# Patient Record
Sex: FEMALE | Race: WHITE | NOT HISPANIC OR LATINO | Employment: OTHER | URBAN - NONMETROPOLITAN AREA
[De-identification: names, ages, dates, MRNs, and addresses within clinical notes are randomized per-mention and may not be internally consistent; named-entity substitution may affect disease eponyms.]

---

## 2021-03-31 ENCOUNTER — HOSPITAL ENCOUNTER (EMERGENCY)
Facility: HOSPITAL | Age: 66
Discharge: HOME | End: 2021-03-31
Attending: EMERGENCY MEDICINE | Admitting: EMERGENCY MEDICINE
Payer: COMMERCIAL

## 2021-03-31 ENCOUNTER — APPOINTMENT (EMERGENCY)
Dept: RADIOLOGY | Facility: HOSPITAL | Age: 66
End: 2021-03-31
Payer: COMMERCIAL

## 2021-03-31 VITALS
SYSTOLIC BLOOD PRESSURE: 133 MMHG | HEART RATE: 96 BPM | DIASTOLIC BLOOD PRESSURE: 61 MMHG | OXYGEN SATURATION: 96 % | WEIGHT: 293 LBS | RESPIRATION RATE: 20 BRPM | TEMPERATURE: 96.5 F

## 2021-03-31 DIAGNOSIS — R07.9 CHEST PAIN: Primary | ICD-10-CM

## 2021-03-31 DIAGNOSIS — I10 HYPERTENSION: ICD-10-CM

## 2021-03-31 LAB
ALBUMIN SERPL BCP-MCNC: 3.4 G/DL (ref 3.5–5)
ALP SERPL-CCNC: 94 U/L (ref 46–116)
ALT SERPL W P-5'-P-CCNC: 45 U/L (ref 12–78)
ANION GAP SERPL CALCULATED.3IONS-SCNC: 11 MMOL/L (ref 4–13)
APTT PPP: 23 SECONDS (ref 23–37)
AST SERPL W P-5'-P-CCNC: 29 U/L (ref 5–45)
BASOPHILS # BLD AUTO: 0.03 THOUSANDS/ΜL (ref 0–0.1)
BASOPHILS NFR BLD AUTO: 0 % (ref 0–1)
BILIRUB SERPL-MCNC: 0.31 MG/DL (ref 0.2–1)
BUN SERPL-MCNC: 21 MG/DL (ref 5–25)
CALCIUM ALBUM COR SERPL-MCNC: 9.8 MG/DL (ref 8.3–10.1)
CALCIUM SERPL-MCNC: 9.3 MG/DL (ref 8.3–10.1)
CHLORIDE SERPL-SCNC: 99 MMOL/L (ref 100–108)
CO2 SERPL-SCNC: 28 MMOL/L (ref 21–32)
CREAT SERPL-MCNC: 1.28 MG/DL (ref 0.6–1.3)
EOSINOPHIL # BLD AUTO: 0.25 THOUSAND/ΜL (ref 0–0.61)
EOSINOPHIL NFR BLD AUTO: 2 % (ref 0–6)
ERYTHROCYTE [DISTWIDTH] IN BLOOD BY AUTOMATED COUNT: 13.2 % (ref 11.6–15.1)
GFR SERPL CREATININE-BSD FRML MDRD: 43 ML/MIN/1.73SQ M
GLUCOSE SERPL-MCNC: 270 MG/DL (ref 65–140)
HCT VFR BLD AUTO: 44.6 % (ref 34.8–46.1)
HGB BLD-MCNC: 14.6 G/DL (ref 11.5–15.4)
IMM GRANULOCYTES # BLD AUTO: 0.04 THOUSAND/UL (ref 0–0.2)
IMM GRANULOCYTES NFR BLD AUTO: 0 % (ref 0–2)
INR PPP: 0.88 (ref 0.84–1.19)
LYMPHOCYTES # BLD AUTO: 2.69 THOUSANDS/ΜL (ref 0.6–4.47)
LYMPHOCYTES NFR BLD AUTO: 24 % (ref 14–44)
MCH RBC QN AUTO: 27.3 PG (ref 26.8–34.3)
MCHC RBC AUTO-ENTMCNC: 32.7 G/DL (ref 31.4–37.4)
MCV RBC AUTO: 83 FL (ref 82–98)
MONOCYTES # BLD AUTO: 0.78 THOUSAND/ΜL (ref 0.17–1.22)
MONOCYTES NFR BLD AUTO: 7 % (ref 4–12)
NEUTROPHILS # BLD AUTO: 7.61 THOUSANDS/ΜL (ref 1.85–7.62)
NEUTS SEG NFR BLD AUTO: 67 % (ref 43–75)
NRBC BLD AUTO-RTO: 0 /100 WBCS
NT-PROBNP SERPL-MCNC: 51 PG/ML
PLATELET # BLD AUTO: 240 THOUSANDS/UL (ref 149–390)
PMV BLD AUTO: 9.7 FL (ref 8.9–12.7)
POTASSIUM SERPL-SCNC: 3.7 MMOL/L (ref 3.5–5.3)
PROT SERPL-MCNC: 7.4 G/DL (ref 6.4–8.2)
PROTHROMBIN TIME: 11.7 SECONDS (ref 11.6–14.5)
RBC # BLD AUTO: 5.35 MILLION/UL (ref 3.81–5.12)
SODIUM SERPL-SCNC: 138 MMOL/L (ref 136–145)
TROPONIN I SERPL-MCNC: <0.02 NG/ML
WBC # BLD AUTO: 11.4 THOUSAND/UL (ref 4.31–10.16)

## 2021-03-31 PROCEDURE — 93005 ELECTROCARDIOGRAM TRACING: CPT

## 2021-03-31 PROCEDURE — 85025 COMPLETE CBC W/AUTO DIFF WBC: CPT | Performed by: EMERGENCY MEDICINE

## 2021-03-31 PROCEDURE — 80053 COMPREHEN METABOLIC PANEL: CPT | Performed by: EMERGENCY MEDICINE

## 2021-03-31 PROCEDURE — 83880 ASSAY OF NATRIURETIC PEPTIDE: CPT | Performed by: EMERGENCY MEDICINE

## 2021-03-31 PROCEDURE — 99285 EMERGENCY DEPT VISIT HI MDM: CPT

## 2021-03-31 PROCEDURE — 36415 COLL VENOUS BLD VENIPUNCTURE: CPT | Performed by: EMERGENCY MEDICINE

## 2021-03-31 PROCEDURE — 84484 ASSAY OF TROPONIN QUANT: CPT | Performed by: EMERGENCY MEDICINE

## 2021-03-31 PROCEDURE — 85730 THROMBOPLASTIN TIME PARTIAL: CPT | Performed by: EMERGENCY MEDICINE

## 2021-03-31 PROCEDURE — 71045 X-RAY EXAM CHEST 1 VIEW: CPT

## 2021-03-31 PROCEDURE — 85610 PROTHROMBIN TIME: CPT | Performed by: EMERGENCY MEDICINE

## 2021-03-31 PROCEDURE — 99285 EMERGENCY DEPT VISIT HI MDM: CPT | Performed by: EMERGENCY MEDICINE

## 2021-03-31 RX ORDER — ASPIRIN 81 MG/1
162 TABLET, CHEWABLE ORAL ONCE
Status: COMPLETED | OUTPATIENT
Start: 2021-03-31 | End: 2021-03-31

## 2021-03-31 RX ORDER — NITROGLYCERIN 0.4 MG/1
0.4 TABLET SUBLINGUAL ONCE
Status: COMPLETED | OUTPATIENT
Start: 2021-03-31 | End: 2021-03-31

## 2021-03-31 RX ADMIN — ASPIRIN 162 MG: 81 TABLET, CHEWABLE ORAL at 19:10

## 2021-03-31 RX ADMIN — NITROGLYCERIN 0.4 MG: 0.4 TABLET SUBLINGUAL at 19:10

## 2021-03-31 NOTE — ED PROVIDER NOTES
History  Chief Complaint   Patient presents with    Chest Pain     Patient had periodic chest pain throughout the day and an episode of feeling her heart was racing  Patient is a 77-year-old female with history of coronary artery disease presents the emergency department with episode of chest pain that started earlier this afternoon described as 8/10 pain in the center of the chest associated with palpitations and shortness of breath described as heart racing  Patient reports that she took 2 aspirin and on EMS arrival symptoms have now resolved  Patient reports that she has had intermittent chest pains back pains and shortness of breath since being diagnosed with COVID and she has attributed the symptoms to being a COVID long hauler  Patient reports cardiac catheterization 2 years ago no recent cardiac stress test she is scheduled for an echocardiogram on Friday due to recent exertional dyspnea  History provided by:  Patient and EMS personnel  Chest Pain  Pain location:  Substernal area  Pain radiates to:  Does not radiate  Pain severity:  Moderate  Onset quality:  Sudden  Duration:  1 day  Timing:  Intermittent  Progression:  Resolved  Chronicity:  New  Associated symptoms: palpitations and shortness of breath    Associated symptoms: no abdominal pain, no cough, no dizziness, no fatigue, no fever, no headache, no nausea, no numbness, not vomiting and no weakness        None       Past Medical History:   Diagnosis Date    Disease of thyroid gland     MI (myocardial infarction) ([facility] [address] )        Past Surgical History:   Procedure Laterality Date    CARDIAC SURGERY      stent        History reviewed  No pertinent family history  I have reviewed and agree with the history as documented      E-Cigarette/Vaping    E-Cigarette Use Never User      E-Cigarette/Vaping Substances     Social History     Tobacco Use    Smoking status: Never Smoker    Smokeless tobacco: Never Used   Substance Use Topics    Alcohol use: Yes    Drug use: Not Currently       Review of Systems   Constitutional: Negative for activity change, appetite change, chills, fatigue and fever  HENT: Negative for congestion, ear pain, rhinorrhea and sore throat  Eyes: Negative for discharge, redness and visual disturbance  Respiratory: Positive for shortness of breath  Negative for cough, chest tightness and wheezing  Cardiovascular: Positive for chest pain and palpitations  Gastrointestinal: Negative for abdominal pain, constipation, diarrhea, nausea and vomiting  Endocrine: Negative for polydipsia and polyuria  Genitourinary: Negative for difficulty urinating, dysuria, frequency, hematuria and urgency  Musculoskeletal: Negative for arthralgias and myalgias  Skin: Negative for color change, pallor and rash  Neurological: Negative for dizziness, weakness, light-headedness, numbness and headaches  Hematological: Negative for adenopathy  Does not bruise/bleed easily  All other systems reviewed and are negative  Physical Exam  Physical Exam  Vitals signs and nursing note reviewed  Constitutional:       Appearance: She is well-developed  HENT:      Head: Normocephalic and atraumatic  Right Ear: External ear normal       Left Ear: External ear normal       Nose: Nose normal    Eyes:      Conjunctiva/sclera: Conjunctivae normal       Pupils: Pupils are equal, round, and reactive to light  Neck:      Musculoskeletal: Normal range of motion and neck supple  Cardiovascular:      Rate and Rhythm: Normal rate and regular rhythm  Heart sounds: Normal heart sounds  Pulmonary:      Effort: Pulmonary effort is normal  No respiratory distress  Breath sounds: Normal breath sounds  No wheezing or rales  Chest:      Chest wall: No tenderness  Abdominal:      General: Bowel sounds are normal  There is no distension  Palpations: Abdomen is soft  Tenderness: There is no abdominal tenderness   There is no guarding  Musculoskeletal: Normal range of motion  Skin:     General: Skin is warm and dry  Neurological:      Mental Status: She is alert and oriented to person, place, and time  Cranial Nerves: No cranial nerve deficit  Sensory: No sensory deficit           Vital Signs  ED Triage Vitals [03/31/21 1902]   Temperature Pulse Respirations Blood Pressure SpO2   (!) 96 5 °F (35 8 °C) 99 18 (!) 178/83 97 %      Temp Source Heart Rate Source Patient Position - Orthostatic VS BP Location FiO2 (%)   Temporal Monitor Lying Right arm --      Pain Score       No Pain           Vitals:    03/31/21 1902 03/31/21 1915 03/31/21 1945   BP: (!) 178/83 151/70 130/60   Pulse: 99 104 97   Patient Position - Orthostatic VS: Lying Lying Sitting         Visual Acuity      ED Medications  Medications   aspirin chewable tablet 162 mg (162 mg Oral Given 3/31/21 1910)   nitroglycerin (NITROSTAT) SL tablet 0 4 mg (0 4 mg Sublingual Given 3/31/21 1910)       Diagnostic Studies  Results Reviewed     Procedure Component Value Units Date/Time    NT-BNP PRO [[id number]]  (Normal) Collected: 03/31/21 1908    Lab Status: Final result Specimen: Blood from Arm, Left Updated: 03/31/21 1939     NT-proBNP 51 pg/mL     Comprehensive metabolic panel [[id number]]  (Abnormal) Collected: 03/31/21 1908    Lab Status: Final result Specimen: Blood from Arm, Left Updated: 03/31/21 1936     Sodium 138 mmol/L      Potassium 3 7 mmol/L      Chloride 99 mmol/L      CO2 28 mmol/L      ANION GAP 11 mmol/L      BUN 21 mg/dL      Creatinine 1 28 mg/dL      Glucose 270 mg/dL      Calcium 9 3 mg/dL      Corrected Calcium 9 8 mg/dL      AST 29 U/L      ALT 45 U/L      Alkaline Phosphatase 94 U/L      Total Protein 7 4 g/dL      Albumin 3 4 g/dL      Total Bilirubin 0 31 mg/dL      eGFR 43 ml/min/1 73sq m     Narrative:      [place] guidelines for Chronic Kidney Disease (CKD):     Stage 1 with normal or high GFR (GFR > 90 mL/min/1 73 square meters)    Stage 2 Mild CKD (GFR = 60-89 mL/min/1 73 square meters)    Stage 3A Moderate CKD (GFR = 45-59 mL/min/1 73 square meters)    Stage 3B Moderate CKD (GFR = 30-44 mL/min/1 73 square meters)    Stage 4 Severe CKD (GFR = 15-29 mL/min/1 73 square meters)    Stage 5 End Stage CKD (GFR <15 mL/min/1 73 square meters)  Note: GFR calculation is accurate only with a steady state creatinine    Troponin I [[id number]]  (Normal) Collected: 03/31/21 1908    Lab Status: Final result Specimen: Blood from Arm, Left Updated: 03/31/21 1935     Troponin I <0 02 ng/mL     Protime-INR [[id number]]  (Normal) Collected: 03/31/21 1908    Lab Status: Final result Specimen: Blood from Arm, Left Updated: 03/31/21 1929     Protime 11 7 seconds      INR 0 88    APTT [[id number]]  (Normal) Collected: 03/31/21 1908    Lab Status: Final result Specimen: Blood from Arm, Left Updated: 03/31/21 1929     PTT 23 seconds     CBC and differential [[id number]]  (Abnormal) Collected: 03/31/21 1908    Lab Status: Final result Specimen: Blood from Arm, Left Updated: 03/31/21 1915     WBC 11 40 Thousand/uL      RBC 5 35 Million/uL      Hemoglobin 14 6 g/dL      Hematocrit 44 6 %      MCV 83 fL      MCH 27 3 pg      MCHC 32 7 g/dL      RDW 13 2 %      MPV 9 7 fL      Platelets 915 Thousands/uL      nRBC 0 /100 WBCs      Neutrophils Relative 67 %      Immat GRANS % 0 %      Lymphocytes Relative 24 %      Monocytes Relative 7 %      Eosinophils Relative 2 %      Basophils Relative 0 %      Neutrophils Absolute 7 61 Thousands/µL      Immature Grans Absolute 0 04 Thousand/uL      Lymphocytes Absolute 2 69 Thousands/µL      Monocytes Absolute 0 78 Thousand/µL      Eosinophils Absolute 0 25 Thousand/µL      Basophils Absolute 0 03 Thousands/µL                  XR chest 1 view portable   ED Interpretation by Judeen Lesches, DO (03/31 1947)   No acute disease                 Procedures  ECG 12 Lead Documentation Only    Date/Time: 3/31/2021 7:08 PM  Performed by: Akira Diaz DO  Authorized by: Akira Diaz DO     ECG reviewed by me, the ED Provider: yes    Patient location:  ED  Previous ECG:     Comparison to cardiac monitor: Yes    Quality:     Tracing quality:  Limited by artifact  Rate:     ECG rate:  99    ECG rate assessment: normal    Rhythm:     Rhythm: sinus rhythm    QRS:     QRS axis:  Normal    QRS intervals:  Normal  ST segments:     ST segments:  Non-specific  T waves:     T waves: non-specific               ED Course  ED Course as of Mar 31 1959   Wed Mar 31, 2021   1954 Patient remained clinically and hemodynamically stable in the emergency department initial cardiac workup returns unremarkable patient family updated given her heart score of 6 and no recent cardiac stress test and severity of her chest pain symptoms this evening I recommended admission for further evaluation monitoring and treatment for her chest pain and ACS rule out patient and family understood the reasoning behind this recommendation and understand the risks including heart attack and death and loss of current function  Both patient and  are adamantly adverse to being admitted at this point they do wish to return home now she is asymptomatic and feeling well and states she will follow up promptly with her cardiologist Dr Dean Weston tomorrow and is scheduled for an echocardiogram on Friday  Advised to follow up promptly with Cardiology advised patient family that they will be signing out against medical advice they understand and agree they are welcome to return and understand this as well                    HEART Risk Score      Most Recent Value   Heart Score Risk Calculator   History  1 Filed at: 03/31/2021 1906   ECG  1 Filed at: 03/31/2021 1906   Age  2 Filed at: 03/31/2021 1906   Risk Factors  2 Filed at: 03/31/2021 1906   Troponin  0 Filed at: 03/31/2021 1906   HEART Score  6 Filed at: 03/31/2021 1906                      SBIRT 22yo+      Most Recent Value   SBIRT (22 yo +)   In order to provide better care to our patients, we are screening all of our patients for alcohol and drug use  Would it be okay to ask you these screening questions? Yes Filed at: 03/31/2021 1914   Initial Alcohol Screen: US AUDIT-C    1  How often do you have a drink containing alcohol? 1 Filed at: 03/31/2021 1914   2  How many drinks containing alcohol do you have on a typical day you are drinking? 1 Filed at: 03/31/2021 1914   3a  Male UNDER 65: How often do you have five or more drinks on one occasion? 0 Filed at: 03/31/2021 1914   3b  FEMALE Any Age, or MALE 65+: How often do you have 4 or more drinks on one occassion? 0 Filed at: 03/31/2021 1914   Audit-C Score  2 Filed at: 03/31/2021 1914   SELINA: How many times in the past year have you    Used an illegal drug or used a prescription medication for non-medical reasons? Never Filed at: 03/31/2021 1914                    MDM  Number of Diagnoses or Management Options  Chest pain: new and requires workup  Hypertension: new and requires workup  Diagnosis management comments: Patient remained clinically and hemodynamically stable in the emergency department she wrist return home on all my recommendation was for admission and further observation and monitoring she understood this and the reasons including no recent cardiac stress test and severity of chest pain tonight although it is resolved both her and spouse wished to return home advised prompt follow-up with Cardiology as soon as possible for further evaluation and testing patient family understand and agree and will follow up as advised patient signed out against medical advice understand she is welcome to return return precautions and anticipatory guidance discussed           Amount and/or Complexity of Data Reviewed  Clinical lab tests: ordered and reviewed  Tests in the radiology section of CPT®: ordered and reviewed  Tests in the medicine section of CPT®: ordered and reviewed  Decide to obtain previous medical records or to obtain history from someone other than the patient: yes  Review and summarize past medical records: yes  Independent visualization of images, tracings, or specimens: yes    Risk of Complications, Morbidity, and/or Mortality  Presenting problems: moderate  Diagnostic procedures: moderate  Management options: moderate    Patient Progress  Patient progress: stable      Disposition  Final diagnoses:   Chest pain   Hypertension     Time reflects when diagnosis was documented in both MDM as applicable and the Disposition within this note     Time User Action Codes Description Comment    3/31/2021  7:52 PM Ova Leon Add [R07 9] Chest pain     3/31/2021  7:52 PM Ova Leon Add [I10] Hypertension       ED Disposition     ED Disposition Condition Date/Time Comment    AMA  Wed Mar 31, 2021  7:53 PM Date: 3/31/2021  Patient: Matthew Harp  Admitted: 3/31/2021  6:58 PM  Attending Provider: Gamal Hyde DO    Matthew Harp or her authorized caregiver has made the decision for the patient to leave the emergency department against the advice  of her attending physician  She or her authorized caregiver has been informed and understands the inherent risks, including death, loss of current function  She or her authorized caregiver has decided to accept the responsibility for this decision  Matthew Harp and all necessary parties have been advised that she may return for further evaluation or treatment  Her condition at time of discharge was stable    Matthew Harp had current vital signs as follows:  /60 (BP Location: Left arm )   Pulse 97   Temp (!) 96 5 °F (35 8 °C) (Temporal)   Resp (!) 28   Wt (!) 146 kg (322 lb 15 6 oz)         Follow-up Information     Follow up With Specialties Details Why Contact Info      Schedule an appointment as soon as possible for a visit in 2 days  Your cardiologist Dr Lulu Allen          Patient's Medications    No medications on file     No discharge procedures on file      PDMP Review     None          ED Provider  Electronically Signed by           Robbi Lundberg DO  03/31/21 1959

## 2021-04-04 LAB
ATRIAL RATE: 99 BPM
P AXIS: 47 DEGREES
PR INTERVAL: 160 MS
QRS AXIS: 53 DEGREES
QRSD INTERVAL: 84 MS
QT INTERVAL: 358 MS
QTC INTERVAL: 459 MS
T WAVE AXIS: 36 DEGREES
VENTRICULAR RATE: 99 BPM

## 2021-04-04 PROCEDURE — 93010 ELECTROCARDIOGRAM REPORT: CPT | Performed by: INTERNAL MEDICINE

## 2021-04-06 DIAGNOSIS — Z23 ENCOUNTER FOR IMMUNIZATION: ICD-10-CM

## 2021-06-05 ENCOUNTER — APPOINTMENT (OUTPATIENT)
Dept: CT IMAGING | Facility: HOSPITAL | Age: 66
End: 2021-06-05
Payer: COMMERCIAL

## 2021-06-05 ENCOUNTER — APPOINTMENT (EMERGENCY)
Dept: RADIOLOGY | Facility: HOSPITAL | Age: 66
End: 2021-06-05
Payer: COMMERCIAL

## 2021-06-05 ENCOUNTER — HOSPITAL ENCOUNTER (OUTPATIENT)
Facility: HOSPITAL | Age: 66
Setting detail: OBSERVATION
Discharge: HOME | End: 2021-06-05
Attending: EMERGENCY MEDICINE | Admitting: FAMILY MEDICINE
Payer: COMMERCIAL

## 2021-06-05 VITALS
TEMPERATURE: 98.5 F | HEART RATE: 73 BPM | SYSTOLIC BLOOD PRESSURE: 139 MMHG | WEIGHT: 293 LBS | HEIGHT: 68 IN | OXYGEN SATURATION: 96 % | DIASTOLIC BLOOD PRESSURE: 63 MMHG | RESPIRATION RATE: 18 BRPM | BODY MASS INDEX: 44.41 KG/M2

## 2021-06-05 DIAGNOSIS — N17.9 AKI (ACUTE KIDNEY INJURY) (HCC): ICD-10-CM

## 2021-06-05 DIAGNOSIS — I10 HTN (HYPERTENSION): ICD-10-CM

## 2021-06-05 DIAGNOSIS — R93.2 ABNORMAL CT SCAN, GALLBLADDER: ICD-10-CM

## 2021-06-05 DIAGNOSIS — R07.9 CHEST PAIN: Primary | ICD-10-CM

## 2021-06-05 DIAGNOSIS — K80.50 BILIARY COLIC: ICD-10-CM

## 2021-06-05 PROBLEM — E03.9 ACQUIRED HYPOTHYROIDISM: Status: ACTIVE | Noted: 2021-06-05

## 2021-06-05 PROBLEM — E66.09 OBESITY DUE TO EXCESS CALORIES WITH SERIOUS COMORBIDITY: Status: ACTIVE | Noted: 2021-06-05

## 2021-06-05 PROBLEM — R60.0 LEG EDEMA: Status: ACTIVE | Noted: 2021-06-05

## 2021-06-05 PROBLEM — E11.65 TYPE 2 DIABETES MELLITUS WITH HYPERGLYCEMIA, WITHOUT LONG-TERM CURRENT USE OF INSULIN (HCC): Status: ACTIVE | Noted: 2021-06-05

## 2021-06-05 LAB
ALBUMIN SERPL BCP-MCNC: 3.3 G/DL (ref 3.5–5)
ALP SERPL-CCNC: 101 U/L (ref 46–116)
ALT SERPL W P-5'-P-CCNC: 32 U/L (ref 12–78)
ANION GAP SERPL CALCULATED.3IONS-SCNC: 3 MMOL/L (ref 4–13)
ANION GAP SERPL CALCULATED.3IONS-SCNC: 5 MMOL/L (ref 4–13)
APTT PPP: 26 SECONDS (ref 23–37)
AST SERPL W P-5'-P-CCNC: 22 U/L (ref 5–45)
BASOPHILS # BLD AUTO: 0.05 THOUSANDS/ΜL (ref 0–0.1)
BASOPHILS NFR BLD AUTO: 0 % (ref 0–1)
BILIRUB SERPL-MCNC: 0.27 MG/DL (ref 0.2–1)
BUN SERPL-MCNC: 18 MG/DL (ref 5–25)
BUN SERPL-MCNC: 18 MG/DL (ref 5–25)
CALCIUM ALBUM COR SERPL-MCNC: 9.6 MG/DL (ref 8.3–10.1)
CALCIUM SERPL-MCNC: 8.9 MG/DL (ref 8.3–10.1)
CALCIUM SERPL-MCNC: 9 MG/DL (ref 8.3–10.1)
CHLORIDE SERPL-SCNC: 100 MMOL/L (ref 100–108)
CHLORIDE SERPL-SCNC: 102 MMOL/L (ref 100–108)
CHOLEST SERPL-MCNC: 128 MG/DL (ref 50–200)
CO2 SERPL-SCNC: 30 MMOL/L (ref 21–32)
CO2 SERPL-SCNC: 32 MMOL/L (ref 21–32)
CREAT SERPL-MCNC: 1.27 MG/DL (ref 0.6–1.3)
CREAT SERPL-MCNC: 1.48 MG/DL (ref 0.6–1.3)
EOSINOPHIL # BLD AUTO: 0.25 THOUSAND/ΜL (ref 0–0.61)
EOSINOPHIL NFR BLD AUTO: 2 % (ref 0–6)
ERYTHROCYTE [DISTWIDTH] IN BLOOD BY AUTOMATED COUNT: 13.3 % (ref 11.6–15.1)
EST. AVERAGE GLUCOSE BLD GHB EST-MCNC: 220 MG/DL
GFR SERPL CREATININE-BSD FRML MDRD: 36 ML/MIN/1.73SQ M
GFR SERPL CREATININE-BSD FRML MDRD: 43 ML/MIN/1.73SQ M
GLUCOSE SERPL-MCNC: 180 MG/DL (ref 65–140)
GLUCOSE SERPL-MCNC: 201 MG/DL (ref 65–140)
GLUCOSE SERPL-MCNC: 217 MG/DL (ref 65–140)
GLUCOSE SERPL-MCNC: 230 MG/DL (ref 65–140)
GLUCOSE SERPL-MCNC: 236 MG/DL (ref 65–140)
GLUCOSE SERPL-MCNC: 249 MG/DL (ref 65–140)
HBA1C MFR BLD: 9.3 %
HCT VFR BLD AUTO: 44.3 % (ref 34.8–46.1)
HDLC SERPL-MCNC: 50 MG/DL
HGB BLD-MCNC: 14.3 G/DL (ref 11.5–15.4)
IMM GRANULOCYTES # BLD AUTO: 0.04 THOUSAND/UL (ref 0–0.2)
IMM GRANULOCYTES NFR BLD AUTO: 0 % (ref 0–2)
INR PPP: 0.91 (ref 0.84–1.19)
LDLC SERPL CALC-MCNC: 60 MG/DL (ref 0–100)
LIPASE SERPL-CCNC: 83 U/L (ref 73–393)
LYMPHOCYTES # BLD AUTO: 2.91 THOUSANDS/ΜL (ref 0.6–4.47)
LYMPHOCYTES NFR BLD AUTO: 26 % (ref 14–44)
MCH RBC QN AUTO: 27.2 PG (ref 26.8–34.3)
MCHC RBC AUTO-ENTMCNC: 32.3 G/DL (ref 31.4–37.4)
MCV RBC AUTO: 84 FL (ref 82–98)
MONOCYTES # BLD AUTO: 0.71 THOUSAND/ΜL (ref 0.17–1.22)
MONOCYTES NFR BLD AUTO: 6 % (ref 4–12)
NEUTROPHILS # BLD AUTO: 7.33 THOUSANDS/ΜL (ref 1.85–7.62)
NEUTS SEG NFR BLD AUTO: 66 % (ref 43–75)
NRBC BLD AUTO-RTO: 0 /100 WBCS
NT-PROBNP SERPL-MCNC: 97 PG/ML
PLATELET # BLD AUTO: 276 THOUSANDS/UL (ref 149–390)
PMV BLD AUTO: 10 FL (ref 8.9–12.7)
POTASSIUM SERPL-SCNC: 3.6 MMOL/L (ref 3.5–5.3)
POTASSIUM SERPL-SCNC: 3.6 MMOL/L (ref 3.5–5.3)
PROT SERPL-MCNC: 7 G/DL (ref 6.4–8.2)
PROTHROMBIN TIME: 12.1 SECONDS (ref 11.6–14.5)
RBC # BLD AUTO: 5.25 MILLION/UL (ref 3.81–5.12)
SODIUM SERPL-SCNC: 135 MMOL/L (ref 136–145)
SODIUM SERPL-SCNC: 137 MMOL/L (ref 136–145)
TRIGL SERPL-MCNC: 92 MG/DL
TROPONIN I SERPL-MCNC: <0.02 NG/ML
TSH SERPL DL<=0.05 MIU/L-ACNC: 1.81 UIU/ML (ref 0.36–3.74)
WBC # BLD AUTO: 11.29 THOUSAND/UL (ref 4.31–10.16)

## 2021-06-05 PROCEDURE — 71045 X-RAY EXAM CHEST 1 VIEW: CPT

## 2021-06-05 PROCEDURE — NC001 PR NO CHARGE: Performed by: FAMILY MEDICINE

## 2021-06-05 PROCEDURE — 36415 COLL VENOUS BLD VENIPUNCTURE: CPT | Performed by: EMERGENCY MEDICINE

## 2021-06-05 PROCEDURE — 85730 THROMBOPLASTIN TIME PARTIAL: CPT | Performed by: EMERGENCY MEDICINE

## 2021-06-05 PROCEDURE — 84484 ASSAY OF TROPONIN QUANT: CPT | Performed by: EMERGENCY MEDICINE

## 2021-06-05 PROCEDURE — 84443 ASSAY THYROID STIM HORMONE: CPT | Performed by: NURSE PRACTITIONER

## 2021-06-05 PROCEDURE — 83036 HEMOGLOBIN GLYCOSYLATED A1C: CPT | Performed by: NURSE PRACTITIONER

## 2021-06-05 PROCEDURE — 80053 COMPREHEN METABOLIC PANEL: CPT | Performed by: EMERGENCY MEDICINE

## 2021-06-05 PROCEDURE — 80061 LIPID PANEL: CPT | Performed by: NURSE PRACTITIONER

## 2021-06-05 PROCEDURE — 82948 REAGENT STRIP/BLOOD GLUCOSE: CPT

## 2021-06-05 PROCEDURE — 83880 ASSAY OF NATRIURETIC PEPTIDE: CPT | Performed by: EMERGENCY MEDICINE

## 2021-06-05 PROCEDURE — 99285 EMERGENCY DEPT VISIT HI MDM: CPT | Performed by: EMERGENCY MEDICINE

## 2021-06-05 PROCEDURE — 93005 ELECTROCARDIOGRAM TRACING: CPT

## 2021-06-05 PROCEDURE — 80048 BASIC METABOLIC PNL TOTAL CA: CPT | Performed by: NURSE PRACTITIONER

## 2021-06-05 PROCEDURE — 99285 EMERGENCY DEPT VISIT HI MDM: CPT

## 2021-06-05 PROCEDURE — 85025 COMPLETE CBC W/AUTO DIFF WBC: CPT | Performed by: EMERGENCY MEDICINE

## 2021-06-05 PROCEDURE — G1004 CDSM NDSC: HCPCS

## 2021-06-05 PROCEDURE — 74176 CT ABD & PELVIS W/O CONTRAST: CPT

## 2021-06-05 PROCEDURE — 85610 PROTHROMBIN TIME: CPT | Performed by: EMERGENCY MEDICINE

## 2021-06-05 PROCEDURE — 99236 HOSP IP/OBS SAME DATE HI 85: CPT | Performed by: FAMILY MEDICINE

## 2021-06-05 PROCEDURE — 83690 ASSAY OF LIPASE: CPT | Performed by: FAMILY MEDICINE

## 2021-06-05 RX ORDER — HYDROCHLOROTHIAZIDE 25 MG/1
25 TABLET ORAL
COMMUNITY
End: 2021-06-05 | Stop reason: HOSPADM

## 2021-06-05 RX ORDER — NEBIVOLOL 5 MG/1
5 TABLET ORAL
COMMUNITY
End: 2021-06-05 | Stop reason: HOSPADM

## 2021-06-05 RX ORDER — LOSARTAN POTASSIUM 50 MG/1
50 TABLET ORAL DAILY
Status: DISCONTINUED | OUTPATIENT
Start: 2021-06-05 | End: 2021-06-05 | Stop reason: HOSPADM

## 2021-06-05 RX ORDER — ACETAMINOPHEN 325 MG/1
650 TABLET ORAL EVERY 6 HOURS PRN
Status: DISCONTINUED | OUTPATIENT
Start: 2021-06-05 | End: 2021-06-05 | Stop reason: HOSPADM

## 2021-06-05 RX ORDER — LEVOTHYROXINE SODIUM 0.15 MG/1
175 TABLET ORAL
COMMUNITY

## 2021-06-05 RX ORDER — LEVOTHYROXINE SODIUM 175 UG/1
175 TABLET ORAL
Status: DISCONTINUED | OUTPATIENT
Start: 2021-06-05 | End: 2021-06-05 | Stop reason: HOSPADM

## 2021-06-05 RX ORDER — PANTOPRAZOLE SODIUM 20 MG/1
20 TABLET, DELAYED RELEASE ORAL DAILY
COMMUNITY
Start: 2021-03-14

## 2021-06-05 RX ORDER — NITROGLYCERIN 0.4 MG/1
TABLET SUBLINGUAL
COMMUNITY

## 2021-06-05 RX ORDER — ASPIRIN 81 MG/1
81 TABLET, CHEWABLE ORAL DAILY
Status: DISCONTINUED | OUTPATIENT
Start: 2021-06-06 | End: 2021-06-05 | Stop reason: HOSPADM

## 2021-06-05 RX ORDER — LOSARTAN POTASSIUM 50 MG/1
50 TABLET ORAL
COMMUNITY
Start: 2021-01-28

## 2021-06-05 RX ORDER — MELOXICAM 15 MG/1
15 TABLET ORAL
COMMUNITY
End: 2021-06-05 | Stop reason: HOSPADM

## 2021-06-05 RX ORDER — EZETIMIBE 10 MG/1
10 TABLET ORAL
Status: DISCONTINUED | OUTPATIENT
Start: 2021-06-05 | End: 2021-06-05 | Stop reason: HOSPADM

## 2021-06-05 RX ORDER — PANTOPRAZOLE SODIUM 20 MG/1
20 TABLET, DELAYED RELEASE ORAL
Status: DISCONTINUED | OUTPATIENT
Start: 2021-06-05 | End: 2021-06-05 | Stop reason: HOSPADM

## 2021-06-05 RX ORDER — EZETIMIBE 10 MG/1
10 TABLET ORAL
COMMUNITY
Start: 2021-04-09

## 2021-06-05 RX ORDER — METOPROLOL SUCCINATE 25 MG/1
25 TABLET, EXTENDED RELEASE ORAL DAILY
Status: DISCONTINUED | OUTPATIENT
Start: 2021-06-05 | End: 2021-06-05 | Stop reason: HOSPADM

## 2021-06-05 RX ORDER — MAGNESIUM HYDROXIDE/ALUMINUM HYDROXICE/SIMETHICONE 120; 1200; 1200 MG/30ML; MG/30ML; MG/30ML
30 SUSPENSION ORAL ONCE
Status: COMPLETED | OUTPATIENT
Start: 2021-06-05 | End: 2021-06-05

## 2021-06-05 RX ORDER — METOPROLOL SUCCINATE 25 MG/1
25 TABLET, EXTENDED RELEASE ORAL
COMMUNITY
Start: 2020-07-02 | End: 2021-06-27

## 2021-06-05 RX ORDER — NITROGLYCERIN 0.4 MG/1
0.4 TABLET SUBLINGUAL ONCE
Status: COMPLETED | OUTPATIENT
Start: 2021-06-05 | End: 2021-06-05

## 2021-06-05 RX ADMIN — INSULIN LISPRO 4 UNITS: 100 INJECTION, SOLUTION INTRAVENOUS; SUBCUTANEOUS at 02:27

## 2021-06-05 RX ADMIN — ALUMINUM HYDROXIDE, MAGNESIUM HYDROXIDE, AND SIMETHICONE 30 ML: 200; 200; 20 SUSPENSION ORAL at 09:44

## 2021-06-05 RX ADMIN — PANTOPRAZOLE SODIUM 20 MG: 20 TABLET, DELAYED RELEASE ORAL at 06:24

## 2021-06-05 RX ADMIN — LOSARTAN POTASSIUM 50 MG: 50 TABLET, FILM COATED ORAL at 08:04

## 2021-06-05 RX ADMIN — METOPROLOL SUCCINATE 25 MG: 25 TABLET, EXTENDED RELEASE ORAL at 08:04

## 2021-06-05 RX ADMIN — INSULIN LISPRO 4 UNITS: 100 INJECTION, SOLUTION INTRAVENOUS; SUBCUTANEOUS at 11:17

## 2021-06-05 RX ADMIN — LEVOTHYROXINE SODIUM 175 MCG: 175 TABLET ORAL at 06:24

## 2021-06-05 RX ADMIN — INSULIN LISPRO 4 UNITS: 100 INJECTION, SOLUTION INTRAVENOUS; SUBCUTANEOUS at 08:05

## 2021-06-05 RX ADMIN — NITROGLYCERIN 0.4 MG: 0.4 TABLET SUBLINGUAL at 00:26

## 2021-06-05 RX ADMIN — ENOXAPARIN SODIUM 40 MG: 40 INJECTION SUBCUTANEOUS at 02:27

## 2021-06-05 NOTE — DISCHARGE SUMMARY
[address]  Discharge- Juan Antonio Hyatt 1955, 74 y o  female MRN: [id number]  Unit/Bed#: -[facility] Encounter: [id number]  Primary Care Provider: No primary care provider on file  Date and time admitted to hospital: 6/5/2021 12:08 AM    Biliary colic  Assessment & Plan  · The pain has resolved after Maalox her CT abdomen and pelvis did reveal cholelithiasis abnormal gallbladder wall patient does have this is her 1st attack and she does not have any LFTs elevation lipase is normal and her T bili is normal she tolerated diet I discussed with General surgery evaluated the patient will discharge home with ultrasound of the gallbladder as an outpatient and follow-up with surgery to discuss elective cholecystectomy I discussed with her diet in terms of avoiding a lot of dairy and fried fatty foods  Essential hypertension  Assessment & Plan  · BP reviewed  · Continue losartan and metoprolol   · Monitor blood pressure    Acquired hypothyroidism  Assessment & Plan  · History of complete thyroidectomy  · Continue levothyroxine    Type 2 diabetes mellitus with hyperglycemia, without long-term current use of insulin [facility])  Assessment & Plan  No results found for: HGBA1C    Recent Labs     06/05/21  0216 06/05/21  0741 06/05/21  1116   POCGLU 230* 236* 201*       Blood Sugar Average: Last 72 hrs:  (P) 247 [id number]  · Carb controlled diet  · Resume her metformin    Obesity due to excess calories with serious comorbidity  Assessment & Plan  · Encourage intensive therapeutic lifestyle modification    * Chest pain in adult  Assessment & Plan  · Presents with chest pain  · History of cardiac catheterization with stent in 2017  · Cardiac catheterization June 2020:   · SHIRA score: 2  · Patient states she never had chest pain  Her chest pain is non cardiac related as she actually had right upper quadrant epigastric and left upper quadrant pain this is secondary to biliary colic    Till resolved with the dose of Maalox  · ACS has been ruled out EKG nonischemic and troponins are negative patient will be discharged home with outpatient ultrasound and follow-up with General surgery        Discharging Physician / Practitioner: Ronan Eddy MD  PCP: No primary care provider on file  Admission Date:   Admission Orders (From admission, onward)     Ordered        06/05/21 0115  Place in Observation  Once                   Discharge Date: 06/05/21    Resolved Problems  Date Reviewed: 6/5/2021    None          Consultations During Hospital Stay:  · General surgery    Procedures Performed:     · None    Significant Findings / Test Results:     · CT abdomen and pelvis-  Prominent gallbladder with ill-defined wall with suspected gallstones  Ultrasound evaluation recommended for acute cholecystitis      2  Hepatomegaly with fatty infiltration      3 Left adnexal predominantly cystic focus with eccentric fat and punctate calcification may represent complex cyst or dermoid  Ultrasound evaluation recommended  Incidental Findings:   · See above     Test Results Pending at Discharge (will require follow up): · None     Outpatient Tests Requested:  · Ultrasound of the right upper quadrant    Complications:  None    Reason for Admission:  Chest pain    Hospital Course:     Betsey Dumont is a 74 y o  female patient who originally presented to the hospital on 6/5/2021 due to a chest pain with history of CAD she was admitted for ACS rule out evaluated the patient patient had not had any pain in the chest she actually has abdominal pain especially in the right upper quadrant epigastric left upper quadrant  Her troponins were negative her EKG nonischemic  ACS is completely ruled out her pain is most likely secondary to biliary colic as CT of the abdomen and pelvis confirmed she has abnormal gallbladder wall and cholelithiasis    No evidence of cholecystitis in the CT abdomen and pelvis LFTs are normal and lipase is normal T bili is normal   Patient's abdominal pain resolved and she was able to tolerate a meal after Maalox consultation to General surgery I did discuss with them will be discharged with outpatient right upper quadrant ultrasound and will refer to general surgery to discuss if elective cholecystectomy is warranted at this time is disease all her only 1st attack discussed diet adjustment at home as well otherwise medically clear to be discharged home  Please see above list of diagnoses and related plan for additional information  Condition at Discharge: stable     Discharge Day Visit / Exam:     * Please refer to separate progress note for these details *    Discussion with Family:      Discharge instructions/Information to patient and family:   See after visit summary for information provided to patient and family  Provisions for Follow-Up Care:  See after visit summary for information related to follow-up care and any pertinent home health orders  Disposition:     Home    For Discharges to [facility] SNF:   · Not Applicable to this Patient - Not Applicable to this Patient    Planned Readmission: no     Discharge Statement:  I spent >35 minutes discharging the patient  This time was spent on the day of discharge  I had direct contact with the patient on the day of discharge  Greater than 50% of the total time was spent examining patient, answering all patient questions, arranging and discussing plan of care with patient as well as directly providing post-discharge instructions  Additional time then spent on discharge activities  Discharge Medications:  See after visit summary for reconciled discharge medications provided to patient and family        ** Please Note: This note has been constructed using a voice recognition system **

## 2021-06-05 NOTE — ASSESSMENT & PLAN NOTE
· The pain has resolved after Maalox her CT abdomen and pelvis did reveal cholelithiasis abnormal gallbladder wall patient does have this is her 1st attack and she does not have any LFTs elevation lipase is normal and her T bili is normal she tolerated diet I discussed with General surgery evaluated the patient will discharge home with ultrasound of the gallbladder as an outpatient and follow-up with surgery to discuss elective cholecystectomy I discussed with her diet in terms of avoiding a lot of dairy and fried fatty foods

## 2021-06-05 NOTE — ASSESSMENT & PLAN NOTE
· Presents with chest pain  · History of cardiac catheterization with stent in 2017  · Cardiac catheterization June 2020:   · SHIRA score: 2  · Patient states she never had chest pain  Her chest pain is non cardiac related as she actually had right upper quadrant epigastric and left upper quadrant pain this is secondary to biliary colic    Till resolved with the dose of Maalox  · ACS has been ruled out EKG nonischemic and troponins are negative patient will be discharged home with outpatient ultrasound and follow-up with General surgery

## 2021-06-05 NOTE — ASSESSMENT & PLAN NOTE
· Presents with chest pain  · History of cardiac catheterization with stent in 2017  · Cardiac catheterization June 2020:   · SHIRA score: 2  · EKG:  NSR rate of 69   No evidence of acute infarct  · Chest x-ray:  No acute cardiopulmonary disease  · Troponin: <0 02  · Admit to telemetry  · Serial troponins and EKGs  · Aspirin  · Lipid panel

## 2021-06-05 NOTE — PROGRESS NOTES
Pt expressed concern this morning that the discomfort she is feeling underneath her breasts may be related to her gallbladder/something intestinal rather than of cardiac origin  Discomfort does NOT feel like previous cardiac pain  Discomfort is dull and almost feels like gas pains instead of chest pains

## 2021-06-05 NOTE — PLAN OF CARE

## 2021-06-05 NOTE — H&P
[address] [place] [address] 1955, 74 y o  female MRN: [id number]  Unit/Bed#: -01 Encounter: [id number]  Primary Care Provider: No primary care provider on file  Date and time admitted to hospital: 6/5/2021 12:08 AM    * Chest pain in adult  Assessment & Plan  · Presents with chest pain  · History of cardiac catheterization with stent in 2017  · Cardiac catheterization June 2020:   · SHIRA score: 2  · EKG:  NSR rate of 69  No evidence of acute infarct  · Chest x-ray:  No acute cardiopulmonary disease  · Troponin: <0 02  · Admit to telemetry  · Serial troponins and EKGs  · Aspirin  · Lipid panel    Essential hypertension  Assessment & Plan  · BP reviewed  · Continue losartan and metoprolol   · Monitor blood pressure    Acquired hypothyroidism  Assessment & Plan  · History of complete thyroidectomy  · Continue levothyroxine    Type 2 diabetes mellitus with hyperglycemia, without long-term current use of insulin (HCC)  Assessment & Plan  No results found for: HGBA1C    No results for input(s): POCGLU in the last 72 hours  Blood Sugar Average: Last 72 hrs:    · Carb controlled diet  · Fingerstick blood sugar sliding scale coverage  · Hold Glucophage while in hospital  · Check hemoglobin A1c    Obesity due to excess calories with serious comorbidity  Assessment & Plan  · Encourage intensive therapeutic lifestyle modification    VTE Prophylaxis: Enoxaparin (Lovenox)  / sequential compression device   Code Status:  Full  POLST: POLST form is not discussed and not completed at this time  Discussion with family:  Patient    Anticipated Length of Stay:  Patient will be admitted on an Observation basis with an anticipated length of stay of  less than 2 midnights  Justification for Hospital Stay:  Per plan above    Total Time for Visit, including Counseling / Coordination of Care: 30 minutes    Greater than 50% of this total time spent on direct patient counseling and coordination of care  Chief Complaint:   Chest pain    History of Present Illness:    Ge Johnson is a 74 y o  female history of CAD, MI with stenting who presents with bilateral chest/rib pain  She says it started suddenly two hours prior to arrival, it is constant, described as moderate, non-radiating  She came to the ER for evaluation because she said it felt similar to her prior heart attack  The severity decreased spontaneously, but is still present  She said it is stabbing and bandlike, but it does not feel like indigestion  She did get some relief from sublingual nitroglycerin in the ER  She will be admitted to telemetry observation  She denies fever or chills, shortness of breath, abdominal pain, nausea, diaphoresis, dysuria, flank pain, back pain, dizziness, syncope, or focal weakness  Review of Systems:    Review of Systems   Constitutional: Negative for chills and fever  Respiratory: Negative for cough and shortness of breath  Cardiovascular: Positive for chest pain  Negative for palpitations and leg swelling  Gastrointestinal: Negative for abdominal distention, abdominal pain, constipation, diarrhea, nausea and vomiting  Genitourinary: Negative for dysuria and frequency  Musculoskeletal: Negative for arthralgias and myalgias  Neurological: Negative for dizziness, syncope, weakness and headaches  All other systems reviewed and are negative  Past Medical and Surgical History:     Past Medical History:   Diagnosis Date    Disease of thyroid gland     MI (myocardial infarction) ([facility] [address] )        Past Surgical History:   Procedure Laterality Date    CARDIAC SURGERY      stent        Meds/Allergies:    Prior to Admission medications    Not on File     I have reviewed home medications with patient personally      Allergies: No Known Allergies    Social History:     Marital Status: /Civil Union   Occupation:  Retired county official  Patient Pre-hospital Living Situation: Home with   Patient Pre-hospital Level of Mobility:  Independent  Patient Pre-hospital Diet Restrictions:  Cardiac carb controlled  Substance Use History:   Social History     Substance and Sexual Activity   Alcohol Use Yes     Social History     Tobacco Use   Smoking Status Never Smoker   Smokeless Tobacco Never Used     Social History     Substance and Sexual Activity   Drug Use Not Currently       Family History:    Family History   Problem Relation Age of Onset    Cancer Mother     Heart disease Father        Physical Exam:     Vitals:   Blood Pressure: 159/77 (06/05/21 0602)  Pulse: 65 (06/05/21 0602)  Temperature: 97 6 °F (36 4 °C) (06/05/21 0602)  Temp Source: Axillary (06/05/21 0602)  Respirations: 17 (06/05/21 0602)  Height: 5' 8" (172 7 cm) (06/05/21 0203)  Weight - Scale: (!) 145 kg (319 lb 7 1 oz) (06/05/21 0013)  SpO2: 96 % (06/05/21 0602)    Physical Exam  Vitals signs and nursing note reviewed  Constitutional:       Appearance: She is obese  HENT:      Head: Normocephalic and atraumatic  Mouth/Throat:      Mouth: Mucous membranes are moist       Pharynx: Oropharynx is clear  Eyes:      Extraocular Movements: Extraocular movements intact  Pupils: Pupils are equal, round, and reactive to light  Neck:      Musculoskeletal: Normal range of motion and neck supple  Cardiovascular:      Rate and Rhythm: Normal rate and regular rhythm  Pulses: Normal pulses  Heart sounds: Normal heart sounds  Pulmonary:      Effort: Pulmonary effort is normal       Breath sounds: Normal breath sounds  Abdominal:      General: Abdomen is flat  Bowel sounds are normal       Palpations: Abdomen is soft  Musculoskeletal: Normal range of motion  Skin:     General: Skin is warm and dry  Capillary Refill: Capillary refill takes less than 2 seconds  Neurological:      General: No focal deficit present  Mental Status: She is alert and oriented to person, place, and time  Additional Data:     Lab Results: I have personally reviewed pertinent reports  Results from last 7 days   Lab Units 06/05/21  0024   WBC Thousand/uL 11 29*   HEMOGLOBIN g/dL 14 3   HEMATOCRIT % 44 3   PLATELETS Thousands/uL 276   NEUTROS PCT % 66   LYMPHS PCT % 26   MONOS PCT % 6   EOS PCT % 2     Results from last 7 days   Lab Units 06/05/21  0024   SODIUM mmol/L 137   POTASSIUM mmol/L 3 6   CHLORIDE mmol/L 102   CO2 mmol/L 32   BUN mg/dL 18   CREATININE mg/dL 1 48*   ANION GAP mmol/L 3*   CALCIUM mg/dL 9 0   ALBUMIN g/dL 3 3*   TOTAL BILIRUBIN mg/dL 0 27   ALK PHOS U/L 101   ALT U/L 32   AST U/L 22   GLUCOSE RANDOM mg/dL 249*     Results from last 7 days   Lab Units 06/05/21  0024   INR  0 91     Results from last 7 days   Lab Units 06/05/21  0216   POC GLUCOSE mg/dl 230*               Imaging: I have personally reviewed pertinent reports  XR chest 1 view portable   ED Interpretation by Akira Diaz DO (06/05 0040)   No acute cardiopulmonary disease          Allscripts / Epic Records Reviewed: Yes     ** Please Note: This note has been constructed using a voice recognition system   **

## 2021-06-05 NOTE — ASSESSMENT & PLAN NOTE
No results found for: HGBA1C    Recent Labs     06/05/21  0216 06/05/21  0741 06/05/21  1116   POCGLU 230* 236* 201*       Blood Sugar Average: Last 72 hrs:  (P) 533 [id number]  · Carb controlled diet  · Resume her metformin

## 2021-06-05 NOTE — ASSESSMENT & PLAN NOTE
No results found for: HGBA1C    No results for input(s): POCGLU in the last 72 hours      Blood Sugar Average: Last 72 hrs:    · Carb controlled diet  · Fingerstick blood sugar sliding scale coverage  · Hold Glucophage while in hospital  · Check hemoglobin A1c

## 2021-06-05 NOTE — UTILIZATION REVIEW
Initial Clinical Review    Admission: Date/Time/Statement:   Admission Orders (From admission, onward)     Ordered        06/05/21 0115  Place in Observation  Once                   Orders Placed This Encounter   Procedures    Place in Observation     Standing Status:   Standing     Number of Occurrences:   1     Order Specific Question:   Level of Care     Answer:   Med Surg [16]     ED Arrival Information     Expected Arrival Acuity Means of Arrival Escorted By Service Admission Type    - 6/5/2021 00:08 Emergent Walk-In Spouse Hospitalist Emergency    Arrival Complaint    chest pain         Chief Complaint   Patient presents with    Chest Pain     Pt was sleeping when they got sharp pain under b/l breasts wrapping front to back     Initial Presentation:   74 y o  female history of CAD, MI with stenting who presents with bilateral chest/rib pain  She says it started suddenly two hours prior to arrival, it is constant, described as moderate, non-radiating  She came to the ER for evaluation because she said it felt similar to her prior heart attack  The severity decreased spontaneously, but is still present  She said it is stabbing and bandlike, but it does not feel like indigestion  She did get some relief from sublingual nitroglycerin in the ER  She will be admitted to telemetry observation  She denies fever or chills, shortness of breath, abdominal pain, nausea, diaphoresis, dysuria, flank pain, back pain, dizziness, syncope, or focal weakness    Chest pain in adult  Assessment & Plan  · Presents with chest pain  · History of cardiac catheterization with stent in 2017  · Cardiac catheterization June 2020:   · SHIRA score: 2  · EKG:  NSR rate of 69   No evidence of acute infarct  · Chest x-ray:  No acute cardiopulmonary disease  · Troponin: <0 02  · Admit to telemetry  · Serial troponins and EKGs  · Aspirin  · Lipid panel     Essential hypertension  Assessment & Plan  · BP reviewed  · Continue losartan and metoprolol   · Monitor blood pressure     Acquired hypothyroidism  Assessment & Plan  · History of complete thyroidectomy  · Continue levothyroxine     Type 2 diabetes mellitus with hyperglycemia, without long-term current use of insulin (HCC)  Assessment & Plan  No results found for: HGBA1C     No results for input(s): POCGLU in the last 72 hours      Blood Sugar Average: Last 72 hrs:     · Carb controlled diet  · Fingerstick blood sugar sliding scale coverage  · Hold Glucophage while in hospital  · Check hemoglobin A1c     Obesity due to excess calories with serious comorbidity  Assessment & Plan  · Encourage intensive therapeutic lifestyle modification     VTE Prophylaxis: Enoxaparin (Lovenox)  / sequential compression device     ED Triage Vitals   Temperature Pulse Respirations Blood Pressure SpO2   06/05/21 0013 06/05/21 0013 06/05/21 0013 06/05/21 0013 06/05/21 0013   (!) 97 2 °F (36 2 °C) 74 20 170/75 96 %      Temp Source Heart Rate Source Patient Position - Orthostatic VS BP Location FiO2 (%)   06/05/21 0013 06/05/21 0013 06/05/21 0013 06/05/21 0100 --   Temporal Monitor Lying Right arm       Pain Score       06/05/21 0208       6          Wt Readings from Last 1 Encounters:   06/05/21 (!) 145 kg (319 lb 7 1 oz)     Additional Vital Signs:   06/05/21 07:37:49  98 °F (36 7 °C)  65  20  159/75  103  95 %  --  --   06/05/21 0602  97 6 °F (36 4 °C)  65  17  159/77  104  96 %  --  --   06/05/21 0216  --  --  --  --  --  --  None (Room air)  --   06/05/21 02:03:18  97 8 °F (36 6 °C)  63  15  160/75  103  93 %  --  --   06/05/21 0100  --  71  20  141/75  102  92 %  None (Room air)       Pertinent Labs/Diagnostic Test Results:   6/5 PCXR -       Results from last 7 days   Lab Units 06/05/21  0024   WBC Thousand/uL 11 29*   HEMOGLOBIN g/dL 14 3   HEMATOCRIT % 44 3   PLATELETS Thousands/uL 276   NEUTROS ABS Thousands/µL 7 33         Results from last 7 days   Lab Units 06/05/21  0605 06/05/21  0024   SODIUM mmol/L 135* 137   POTASSIUM mmol/L 3 6 3 6   CHLORIDE mmol/L 100 102   CO2 mmol/L 30 32   ANION GAP mmol/L 5 3*   BUN mg/dL 18 18   CREATININE mg/dL 1 27 1 48*   EGFR ml/min/1 73sq m 43 36   CALCIUM mg/dL 8 9 9 0     Results from last 7 days   Lab Units 06/05/21  0024   AST U/L 22   ALT U/L 32   ALK PHOS U/L 101   TOTAL PROTEIN g/dL 7 0   ALBUMIN g/dL 3 3*   TOTAL BILIRUBIN mg/dL 0 27     Results from last 7 days   Lab Units 06/05/21  0741 06/05/21  0216   POC GLUCOSE mg/dl 236* 230*     Results from last 7 days   Lab Units 06/05/21  0605 06/05/21  0024   GLUCOSE RANDOM mg/dL 217* 249*       Results from last 7 days   Lab Units 06/05/21  0605 06/05/21  0310 06/05/21  0024   TROPONIN I ng/mL <0 02 <0 02 <0 02         Results from last 7 days   Lab Units 06/05/21  0024   PROTIME seconds 12 1   INR  0 91   PTT seconds 26     Results from last 7 days   Lab Units 06/05/21  0605   TSH 3RD GENERATON uIU/mL 1 807       Results from last 7 days   Lab Units 06/05/21  0024   NT-PRO BNP pg/mL 97       ED Treatment:   Medication Administration from 06/05/2021 0008 to 06/05/2021 0157       Date/Time Order Dose Route Action     06/05/2021 0026 nitroglycerin (NITROSTAT) SL tablet 0 4 mg 0 4 mg Sublingual Given        Past Medical History:   Diagnosis Date    Disease of thyroid gland     MI [facility]my[facility] infarction) ([facility] )      Present on Admission:  **None**      Admitting Diagnosis: Chest pain [R07 9]  HTN (hypertension) [I10]  DOMINICK (acute kidney injury) ([facility] [address] ) [N17 9]  Age/Sex: 74 y o  female     Admission Orders:  Scheduled Medications:  [START ON 6/6/2021] aspirin, 81 mg, Oral, Daily  enoxaparin, 40 mg, Subcutaneous, Daily  ezetimibe, 10 mg, Oral, HS  insulin lispro, 2-12 Units, Subcutaneous, TID AC  insulin lispro, 2-12 Units, Subcutaneous, HS  levothyroxine, 175 mcg, Oral, Early Morning  losartan, 50 mg, Oral, Daily  metoprolol succinate, 25 mg, Oral, Daily  nitroglycerin, 0 5 inch, Topical, Once  pantoprazole, 20 mg, Oral, Early Morning      Continuous IV Infusions: None     PRN Meds:  acetaminophen, 650 mg, Oral, Q6H PRN      Tele monitoring    Network Utilization Review Department  ATTENTION: Please call with any questions or concerns to [phone] and carefully listen to the prompts so that you are directed to the right person  All voicemails are confidential   Ty Limb all requests for admission clinical reviews, approved or denied determinations and any other requests to dedicated fax number below belonging to the campus where the patient is receiving treatment   List of dedicated fax numbers for the Facilities:  [address] DENIALS (Administrative/Medical Necessity) [phone]   [address] (Maternity/NICU/Pediatrics) [phone]   [address] [place] [address] [facility] [address] [address] [place] [address] [address] [facility] [place] [address] [phone]

## 2021-06-05 NOTE — PLAN OF CARE
Problem: PAIN - ADULT  Goal: Verbalizes/displays adequate comfort level or baseline comfort level  Description: Interventions:  - Encourage patient to monitor pain and request assistance  - Assess pain using appropriate pain scale  - Administer analgesics based on type and severity of pain and evaluate response  - Implement non-pharmacological measures as appropriate and evaluate response  - Consider cultural and social influences on pain and pain management  - Notify physician/advanced practitioner if interventions unsuccessful or patient reports new pain  Outcome: Progressing     Problem: INFECTION - ADULT  Goal: Absence or prevention of progression during hospitalization  Description: INTERVENTIONS:  - Assess and monitor for signs and symptoms of infection  - Monitor lab/diagnostic results  - Monitor all insertion sites, i e  indwelling lines, tubes, and drains  - Monitor endotracheal if appropriate and nasal secretions for changes in amount and color  - [place] appropriate cooling/warming therapies per order  - Administer medications as ordered  - Instruct and encourage patient and family to use good hand hygiene technique  - Identify and instruct in appropriate isolation precautions for identified infection/condition  Outcome: Progressing  Goal: Absence of fever/infection during neutropenic period  Description: INTERVENTIONS:  - Monitor WBC    Outcome: Progressing     Problem: SAFETY ADULT  Goal: Patient will remain free of falls  Description: INTERVENTIONS:  - Assess patient frequently for physical needs  -  Identify cognitive and physical deficits and behaviors that affect risk of falls    -  [place] fall precautions as indicated by assessment   - Educate patient/family on patient safety including physical limitations  - Instruct patient to call for assistance with activity based on assessment  - Modify environment to reduce risk of injury  - Consider OT/PT consult to assist with strengthening/mobility  Outcome: Progressing  Goal: Maintain or return to baseline ADL function  Description: INTERVENTIONS:  -  Assess patient's ability to carry out ADLs; assess patient's baseline for ADL function and identify physical deficits which impact ability to perform ADLs (bathing, care of mouth/teeth, toileting, grooming, dressing, etc )  - Assess/evaluate cause of self-care deficits   - Assess range of motion  - Assess patient's mobility; develop plan if impaired  - Assess patient's need for assistive devices and provide as appropriate  - Encourage maximum independence but intervene and supervise when necessary  - Involve family in performance of ADLs  - Assess for home care needs following discharge   - Consider OT consult to assist with ADL evaluation and planning for discharge  - Provide patient education as appropriate  Outcome: Progressing  Goal: Maintain or return mobility status to optimal level  Description: INTERVENTIONS:  - Assess patient's baseline mobility status (ambulation, transfers, stairs, etc )    - Identify cognitive and physical deficits and behaviors that affect mobility  - Identify mobility aids required to assist with transfers and/or ambulation (gait belt, sit-to-stand, lift, walker, cane, etc )  - [place] fall precautions as indicated by assessment  - Record patient progress and toleration of activity level on Mobility SBAR; progress patient to next Phase/Stage  - Instruct patient to call for assistance with activity based on assessment  - Consider rehabilitation consult to assist with strengthening/weightbearing, etc   Outcome: Progressing     Problem: DISCHARGE PLANNING  Goal: Discharge to home or other facility with appropriate resources  Description: INTERVENTIONS:  - Identify barriers to discharge w/patient and caregiver  - Arrange for needed discharge resources and transportation as appropriate  - Identify discharge learning needs (meds, wound care, etc )  - Arrange for interpretive services to assist at discharge as needed  - Refer to Case Management Department for coordinating discharge planning if the patient needs post-hospital services based on physician/advanced practitioner order or complex needs related to functional status, cognitive ability, or social support system  Outcome: Progressing     Problem: Knowledge Deficit  Goal: Patient/family/caregiver demonstrates understanding of disease process, treatment plan, medications, and discharge instructions  Description: Complete learning assessment and assess knowledge base  Interventions:  - Provide teaching at level of understanding  - Provide teaching via preferred learning methods  Outcome: Progressing     Problem: Potential for Falls  Goal: Patient will remain free of falls  Description: INTERVENTIONS:  - Assess patient frequently for physical needs  -  Identify cognitive and physical deficits and behaviors that affect risk of falls    -  [place] fall precautions as indicated by assessment   - Educate patient/family on patient safety including physical limitations  - Instruct patient to call for assistance with activity based on assessment  - Modify environment to reduce risk of injury  - Consider OT/PT consult to assist with strengthening/mobility  Outcome: Progressing

## 2021-06-05 NOTE — ED PROVIDER NOTES
History  Chief Complaint   Patient presents with    Chest Pain     Pt was sleeping when they got sharp pain under b/l breasts wrapping front to back     Patient is a 75 year-old female with history of coronary artery disease heart attack in 2017 with stent recently saw cardiology in April after being seen in the ED and leaving against medical advice to follow-up with cardiology had an echocardiogram at that time no recent cardiac stress test by her report  Presents the emergency department tonight complaining of chest pain described as a stabbing around the bilateral lower breast acute onset while sleeping tonight about 2 hours prior to arrival   Patient concerned as symptoms were similar to a prior heart attack seem to be letting up a bit now but still present  Patient took 324 mg of aspirin prior to arrival         History provided by:  Patient and spouse  Chest Pain  Pain location:  L chest and R chest  Pain quality: sharp and stabbing    Pain severity:  Moderate  Onset quality:  Sudden  Duration:  2 hours  Timing:  Constant  Progression:  Improving  Chronicity:  New  Associated symptoms: no abdominal pain, no cough, no dizziness, no fatigue, no fever, no headache, no nausea, no numbness, no palpitations, no shortness of breath, not vomiting and no weakness        None       Past Medical History:   Diagnosis Date    Disease of thyroid gland     MI (myocardial infarction) ([facility] [address] )        Past Surgical History:   Procedure Laterality Date    CARDIAC SURGERY      stent        History reviewed  No pertinent family history  I have reviewed and agree with the history as documented  E-Cigarette/Vaping    E-Cigarette Use Never User      E-Cigarette/Vaping Substances     Social History     Tobacco Use    Smoking status: Never Smoker    Smokeless tobacco: Never Used   Substance Use Topics    Alcohol use:  Yes    Drug use: Not Currently       Review of Systems   Constitutional: Negative for activity change, appetite change, chills, fatigue and fever  HENT: Negative for congestion, ear pain, rhinorrhea and sore throat  Eyes: Negative for discharge, redness and visual disturbance  Respiratory: Negative for cough, chest tightness, shortness of breath and wheezing  Cardiovascular: Positive for chest pain  Negative for palpitations  Gastrointestinal: Negative for abdominal pain, constipation, diarrhea, nausea and vomiting  Endocrine: Negative for polydipsia and polyuria  Genitourinary: Negative for difficulty urinating, dysuria, frequency, hematuria and urgency  Musculoskeletal: Negative for arthralgias and myalgias  Skin: Negative for color change, pallor and rash  Neurological: Negative for dizziness, weakness, light-headedness, numbness and headaches  Hematological: Negative for adenopathy  Does not bruise/bleed easily  All other systems reviewed and are negative  Physical Exam  Physical Exam  Vitals signs and nursing note reviewed  Constitutional:       Appearance: She is well-developed  HENT:      Head: Normocephalic and atraumatic  Right Ear: External ear normal       Left Ear: External ear normal       Nose: Nose normal    Eyes:      Conjunctiva/sclera: Conjunctivae normal       Pupils: Pupils are equal, round, and reactive to light  Neck:      Musculoskeletal: Normal range of motion and neck supple  Cardiovascular:      Rate and Rhythm: Normal rate and regular rhythm  Heart sounds: Normal heart sounds  Pulmonary:      Effort: Pulmonary effort is normal  No respiratory distress  Breath sounds: Normal breath sounds  No wheezing or rales  Chest:      Chest wall: No tenderness  Abdominal:      General: Bowel sounds are normal  There is no distension  Palpations: Abdomen is soft  Tenderness: There is no abdominal tenderness  There is no guarding  Musculoskeletal: Normal range of motion  Skin:     General: Skin is warm and dry     Neurological: Mental Status: She is alert and oriented to person, place, and time  Cranial Nerves: No cranial nerve deficit  Sensory: No sensory deficit           Vital Signs  ED Triage Vitals   Temperature Pulse Respirations Blood Pressure SpO2   06/05/21 0013 06/05/21 0013 06/05/21 0013 06/05/21 0013 06/05/21 0013   (!) 97 2 °F (36 2 °C) 74 20 170/75 96 %      Temp Source Heart Rate Source Patient Position - Orthostatic VS BP Location FiO2 (%)   06/05/21 0013 06/05/21 0013 06/05/21 0013 06/05/21 0100 --   Temporal Monitor Lying Right arm       Pain Score       --                  Vitals:    06/05/21 0013 06/05/21 0100   BP: 170/75 141/75   Pulse: 74 71   Patient Position - Orthostatic VS: Lying Sitting         Visual Acuity      ED Medications  Medications   nitroglycerin (NITRO-BID) 2 % TD ointment 0 5 inch (has no administration in time range)   nitroglycerin (NITROSTAT) SL tablet 0 4 mg (0 4 mg Sublingual Given 6/5/21 0026)       Diagnostic Studies  Results Reviewed     Procedure Component Value Units Date/Time    NT-BNP PRO [[id number]]  (Normal) Collected: 06/05/21 0024    Lab Status: Final result Specimen: Blood from Arm, Left Updated: 06/05/21 0055     NT-proBNP 97 pg/mL     Troponin I [[id number]]  (Normal) Collected: 06/05/21 0024    Lab Status: Final result Specimen: Blood from Arm, Left Updated: 06/05/21 0053     Troponin I <0 02 ng/mL     Comprehensive metabolic panel [[id number]]  (Abnormal) Collected: 06/05/21 0024    Lab Status: Final result Specimen: Blood from Arm, Left Updated: 06/05/21 0051     Sodium 137 mmol/L      Potassium 3 6 mmol/L      Chloride 102 mmol/L      CO2 32 mmol/L      ANION GAP 3 mmol/L      BUN 18 mg/dL      Creatinine 1 48 mg/dL      Glucose 249 mg/dL      Calcium 9 0 mg/dL      Corrected Calcium 9 6 mg/dL      AST 22 U/L      ALT 32 U/L      Alkaline Phosphatase 101 U/L      Total Protein 7 0 g/dL      Albumin 3 3 g/dL      Total Bilirubin 0 27 mg/dL      eGFR 36 ml/min/1 73sq m Narrative:      National Kidney Disease Foundation guidelines for Chronic Kidney Disease (CKD):     Stage 1 with normal or high GFR (GFR > 90 mL/min/1 73 square meters)    Stage 2 Mild CKD (GFR = 60-89 mL/min/1 73 square meters)    Stage 3A Moderate CKD (GFR = 45-59 mL/min/1 73 square meters)    Stage 3B Moderate CKD (GFR = 30-44 mL/min/1 73 square meters)    Stage 4 Severe CKD (GFR = 15-29 mL/min/1 73 square meters)    Stage 5 End Stage CKD (GFR <15 mL/min/1 73 square meters)  Note: GFR calculation is accurate only with a steady state creatinine    Protime-INR [[id number]]  (Normal) Collected: 06/05/21 0024    Lab Status: Final result Specimen: Blood from Arm, Left Updated: 06/05/21 0044     Protime 12 1 seconds      INR 0 91    APTT [[id number]]  (Normal) Collected: 06/05/21 0024    Lab Status: Final result Specimen: Blood from Arm, Left Updated: 06/05/21 0044     PTT 26 seconds     CBC and differential [[id number]]  (Abnormal) Collected: 06/05/21 0024    Lab Status: Final result Specimen: Blood from Arm, Left Updated: 06/05/21 0031     WBC 11 29 Thousand/uL      RBC 5 25 Million/uL      Hemoglobin 14 3 g/dL      Hematocrit 44 3 %      MCV 84 fL      MCH 27 2 pg      MCHC 32 3 g/dL      RDW 13 3 %      MPV 10 0 fL      Platelets 720 Thousands/uL      nRBC 0 /100 WBCs      Neutrophils Relative 66 %      Immat GRANS % 0 %      Lymphocytes Relative 26 %      Monocytes Relative 6 %      Eosinophils Relative 2 %      Basophils Relative 0 %      Neutrophils Absolute 7 33 Thousands/µL      Immature Grans Absolute 0 04 Thousand/uL      Lymphocytes Absolute 2 91 Thousands/µL      Monocytes Absolute 0 71 Thousand/µL      Eosinophils Absolute 0 25 Thousand/µL      Basophils Absolute 0 05 Thousands/µL     Troponin I repeat in 3 hrs [[id number]]     Lab Status: No result Specimen: Blood                  XR chest 1 view portable   ED Interpretation by Chepe Patel DO (06/05 0040)   No acute cardiopulmonary disease Procedures  ECG 12 Lead Documentation Only    Date/Time: 6/5/2021 12:16 AM  Performed by: Briseida Howell DO  Authorized by: Briseida Howell DO     ECG reviewed by me, the ED Provider: yes    Patient location:  ED  Previous ECG:     Previous ECG:  Compared to current    Similarity:  No change    Comparison to cardiac monitor: Yes    Quality:     Tracing quality:  Limited by artifact  Rate:     ECG rate:  69    ECG rate assessment: normal    Rhythm:     Rhythm: sinus rhythm    QRS:     QRS axis:  Normal    QRS intervals:  Normal  Conduction:     Conduction: normal    ST segments:     ST segments:  Non-specific  T waves:     T waves: non-specific and flattening               ED Course  ED Course as of Jun 05 0116   Sat Jun 05, 2021   0106 Patient had partial improvement in her chest discomfort following sublingual nitroglycerin with improvement in blood pressure still having some discomfort at this point will apply topical nitroglycerin patient has been treated with aspirin as well initial troponin negative given heart score 5 cardiac risk factors and ongoing chest discomfort will refer to the hospitalist for further evaluation monitoring and treatment  Mumtaz Conde Spoke with hospitalist nurse-practitioner on-call Ashleigh Myers reviewed case and findings in the emergency department management thus far she accepts for observation on behalf Dr Tabby Peñaloza                    HEART Risk Score      Most Recent Value   Heart Score Risk Calculator   History  0 Filed at: 06/05/2021 0022   ECG  1 Filed at: 06/05/2021 0022   Age  2 Filed at: 06/05/2021 0022   Risk Factors  2 Filed at: 06/05/2021 0022   Troponin  0 Filed at: 06/05/2021 0022   HEART Score  5 Filed at: 06/05/2021 0022                                    MDM  Number of Diagnoses or Management Options  DOMINICK (acute kidney injury) [facility]): new and requires workup  Chest pain: new and requires workup  HTN (hypertension): new and requires workup     Amount and/or Complexity of Data Reviewed  Clinical lab tests: reviewed and ordered  Tests in the radiology section of CPT®: ordered and reviewed  Tests in the medicine section of CPT®: ordered and reviewed  Decide to obtain previous medical records or to obtain history from someone other than the patient: yes  Review and summarize past medical records: yes  Independent visualization of images, tracings, or specimens: yes    Risk of Complications, Morbidity, and/or Mortality  Presenting problems: moderate  Diagnostic procedures: moderate  Management options: moderate    Patient Progress  Patient progress: stable      Disposition  Final diagnoses:   Chest pain   HTN (hypertension)   DOMINICK (acute kidney injury) ([facility] [address] )     Time reflects when diagnosis was documented in both MDM as applicable and the Disposition within this note     Time User Action Codes Description Comment    6/5/2021  1:07 AM Joni Lakhani Add [R07 9] Chest pain     6/5/2021  1:07 AM Bruna Matthew Add [I10] HTN (hypertension)     6/5/2021  1:07 AM Joni Lakhani Add [N17 9] DOMINICK (acute kidney injury) [facility])       ED Disposition     ED Disposition Condition Date/Time Comment    Admit Stable Sat Jun 5, 2021  1:07 AM Case was discussed with Agnieszka Mahoney and the patient's admission status was agreed to be Admission Status: observation status to the service of Dr Christina Lockwood  Follow-up Information    None         Patient's Medications    No medications on file     No discharge procedures on file      PDMP Review     None          ED Provider  Electronically Signed by           Jaylin Colon DO  06/05/21 0116

## 2021-06-05 NOTE — NURSING NOTE
Peripheral IV removed  AVS printed and reviewed with pt  Belongings sent with pt  Accompanied off unit in wheelchair with RN  All questions answered

## 2021-06-07 LAB
ATRIAL RATE: 55 BPM
ATRIAL RATE: 56 BPM
ATRIAL RATE: 58 BPM
ATRIAL RATE: 65 BPM
ATRIAL RATE: 69 BPM
P AXIS: 38 DEGREES
P AXIS: 51 DEGREES
P AXIS: 52 DEGREES
P AXIS: 59 DEGREES
P AXIS: 61 DEGREES
PR INTERVAL: 146 MS
PR INTERVAL: 146 MS
PR INTERVAL: 156 MS
PR INTERVAL: 162 MS
PR INTERVAL: 166 MS
QRS AXIS: 52 DEGREES
QRS AXIS: 52 DEGREES
QRS AXIS: 53 DEGREES
QRS AXIS: 54 DEGREES
QRS AXIS: 55 DEGREES
QRSD INTERVAL: 86 MS
QRSD INTERVAL: 88 MS
QRSD INTERVAL: 90 MS
QRSD INTERVAL: 92 MS
QRSD INTERVAL: 94 MS
QT INTERVAL: 420 MS
QT INTERVAL: 446 MS
QT INTERVAL: 452 MS
QT INTERVAL: 454 MS
QT INTERVAL: 470 MS
QTC INTERVAL: 438 MS
QTC INTERVAL: 443 MS
QTC INTERVAL: 449 MS
QTC INTERVAL: 450 MS
QTC INTERVAL: 463 MS
T WAVE AXIS: 40 DEGREES
T WAVE AXIS: 42 DEGREES
T WAVE AXIS: 43 DEGREES
T WAVE AXIS: 45 DEGREES
T WAVE AXIS: 49 DEGREES
VENTRICULAR RATE: 55 BPM
VENTRICULAR RATE: 56 BPM
VENTRICULAR RATE: 58 BPM
VENTRICULAR RATE: 65 BPM
VENTRICULAR RATE: 69 BPM

## 2021-07-15 ENCOUNTER — HOSPITAL ENCOUNTER (OUTPATIENT)
Dept: RADIOLOGY | Facility: HOSPITAL | Age: 66
Discharge: HOME | End: 2021-07-15
Payer: COMMERCIAL

## 2021-07-15 DIAGNOSIS — M54.16 LUMBAR RADICULOPATHY: ICD-10-CM

## 2021-07-15 PROCEDURE — 72110 X-RAY EXAM L-2 SPINE 4/>VWS: CPT

## 2023-04-02 ENCOUNTER — HOSPITAL ENCOUNTER (INPATIENT)
Facility: HOSPITAL | Age: 68
LOS: 1 days | Discharge: HOME | End: 2023-04-03
Attending: EMERGENCY MEDICINE | Admitting: FAMILY MEDICINE

## 2023-04-02 ENCOUNTER — APPOINTMENT (EMERGENCY)
Dept: RADIOLOGY | Facility: HOSPITAL | Age: 68
End: 2023-04-02

## 2023-04-02 DIAGNOSIS — E83.42 HYPOMAGNESEMIA: ICD-10-CM

## 2023-04-02 DIAGNOSIS — Z86.39 HISTORY OF HYPOTHYROIDISM: ICD-10-CM

## 2023-04-02 DIAGNOSIS — I48.92 ATRIAL FLUTTER (HCC): Primary | ICD-10-CM

## 2023-04-02 PROBLEM — E66.09 OBESITY DUE TO EXCESS CALORIES WITH SERIOUS COMORBIDITY: Status: RESOLVED | Noted: 2021-06-05 | Resolved: 2023-04-02

## 2023-04-02 PROBLEM — E66.01 MORBID OBESITY WITH BMI OF 40.0-44.9, ADULT (HCC): Status: ACTIVE | Noted: 2023-04-02

## 2023-04-02 PROBLEM — R07.9 CHEST PAIN IN ADULT: Status: RESOLVED | Noted: 2021-06-05 | Resolved: 2023-04-02

## 2023-04-02 LAB
2HR DELTA HS TROPONIN: 0 NG/L
4HR DELTA HS TROPONIN: 0 NG/L
ALBUMIN SERPL BCP-MCNC: 4.3 G/DL (ref 3.5–5)
ALP SERPL-CCNC: 60 U/L (ref 34–104)
ALT SERPL W P-5'-P-CCNC: 16 U/L (ref 7–52)
ANION GAP SERPL CALCULATED.3IONS-SCNC: 11 MMOL/L (ref 4–13)
AST SERPL W P-5'-P-CCNC: 18 U/L (ref 13–39)
ATRIAL RATE: 272 BPM
ATRIAL RATE: 76 BPM
ATRIAL RATE: 89 BPM
BASOPHILS # BLD AUTO: 0.04 THOUSANDS/ÂΜL (ref 0–0.1)
BASOPHILS NFR BLD AUTO: 0 % (ref 0–1)
BILIRUB SERPL-MCNC: 0.62 MG/DL (ref 0.2–1)
BUN SERPL-MCNC: 22 MG/DL (ref 5–25)
CALCIUM SERPL-MCNC: 9.4 MG/DL (ref 8.4–10.2)
CARDIAC TROPONIN I PNL SERPL HS: 7 NG/L
CHLORIDE SERPL-SCNC: 102 MMOL/L (ref 96–108)
CO2 SERPL-SCNC: 26 MMOL/L (ref 21–32)
CREAT SERPL-MCNC: 1.16 MG/DL (ref 0.6–1.3)
EOSINOPHIL # BLD AUTO: 0.3 THOUSAND/ÂΜL (ref 0–0.61)
EOSINOPHIL NFR BLD AUTO: 3 % (ref 0–6)
ERYTHROCYTE [DISTWIDTH] IN BLOOD BY AUTOMATED COUNT: 13.7 % (ref 11.6–15.1)
EST. AVERAGE GLUCOSE BLD GHB EST-MCNC: 148 MG/DL
GFR SERPL CREATININE-BSD FRML MDRD: 47 ML/MIN/1.73SQ M
GLUCOSE SERPL-MCNC: 100 MG/DL (ref 65–140)
GLUCOSE SERPL-MCNC: 125 MG/DL (ref 65–140)
GLUCOSE SERPL-MCNC: 95 MG/DL (ref 65–140)
HBA1C MFR BLD: 6.8 %
HCT VFR BLD AUTO: 49.2 % (ref 34.8–46.1)
HGB BLD-MCNC: 15.8 G/DL (ref 11.5–15.4)
IMM GRANULOCYTES # BLD AUTO: 0.02 THOUSAND/UL (ref 0–0.2)
IMM GRANULOCYTES NFR BLD AUTO: 0 % (ref 0–2)
LYMPHOCYTES # BLD AUTO: 2.94 THOUSANDS/ÂΜL (ref 0.6–4.47)
LYMPHOCYTES NFR BLD AUTO: 30 % (ref 14–44)
MAGNESIUM SERPL-MCNC: 1.7 MG/DL (ref 1.9–2.7)
MCH RBC QN AUTO: 26.8 PG (ref 26.8–34.3)
MCHC RBC AUTO-ENTMCNC: 32.1 G/DL (ref 31.4–37.4)
MCV RBC AUTO: 83 FL (ref 82–98)
MONOCYTES # BLD AUTO: 0.77 THOUSAND/ÂΜL (ref 0.17–1.22)
MONOCYTES NFR BLD AUTO: 8 % (ref 4–12)
NEUTROPHILS # BLD AUTO: 5.71 THOUSANDS/ÂΜL (ref 1.85–7.62)
NEUTS SEG NFR BLD AUTO: 59 % (ref 43–75)
NRBC BLD AUTO-RTO: 0 /100 WBCS
P AXIS: 267 DEGREES
P AXIS: 41 DEGREES
P AXIS: 41 DEGREES
PLATELET # BLD AUTO: 235 THOUSANDS/UL (ref 149–390)
PMV BLD AUTO: 9.5 FL (ref 8.9–12.7)
POTASSIUM SERPL-SCNC: 3.7 MMOL/L (ref 3.5–5.3)
PR INTERVAL: 162 MS
PR INTERVAL: 178 MS
PROT SERPL-MCNC: 7.4 G/DL (ref 6.4–8.4)
QRS AXIS: 29 DEGREES
QRS AXIS: 34 DEGREES
QRS AXIS: 36 DEGREES
QRSD INTERVAL: 162 MS
QRSD INTERVAL: 82 MS
QRSD INTERVAL: 86 MS
QT INTERVAL: 366 MS
QT INTERVAL: 378 MS
QT INTERVAL: 402 MS
QTC INTERVAL: 452 MS
QTC INTERVAL: 459 MS
QTC INTERVAL: 550 MS
RBC # BLD AUTO: 5.9 MILLION/UL (ref 3.81–5.12)
SODIUM SERPL-SCNC: 139 MMOL/L (ref 135–147)
T WAVE AXIS: -26 DEGREES
T WAVE AXIS: 42 DEGREES
T WAVE AXIS: 47 DEGREES
T3FREE SERPL-MCNC: 2.58 PG/ML (ref 2.3–4.2)
T4 FREE SERPL-MCNC: 1.51 NG/DL (ref 0.76–1.46)
TSH SERPL DL<=0.05 MIU/L-ACNC: 0.06 UIU/ML (ref 0.45–4.5)
VENTRICULAR RATE: 136 BPM
VENTRICULAR RATE: 76 BPM
VENTRICULAR RATE: 89 BPM
WBC # BLD AUTO: 9.78 THOUSAND/UL (ref 4.31–10.16)

## 2023-04-02 RX ORDER — PANTOPRAZOLE SODIUM 20 MG/1
20 TABLET, DELAYED RELEASE ORAL DAILY
Status: DISCONTINUED | OUTPATIENT
Start: 2023-04-02 | End: 2023-04-03 | Stop reason: HOSPADM

## 2023-04-02 RX ORDER — EZETIMIBE 10 MG/1
10 TABLET ORAL
Status: DISCONTINUED | OUTPATIENT
Start: 2023-04-02 | End: 2023-04-03 | Stop reason: HOSPADM

## 2023-04-02 RX ORDER — ASPIRIN 81 MG/1
81 TABLET ORAL DAILY
Status: DISCONTINUED | OUTPATIENT
Start: 2023-04-02 | End: 2023-04-03 | Stop reason: HOSPADM

## 2023-04-02 RX ORDER — DOCUSATE SODIUM 100 MG/1
100 CAPSULE, LIQUID FILLED ORAL 2 TIMES DAILY
Status: DISCONTINUED | OUTPATIENT
Start: 2023-04-02 | End: 2023-04-03 | Stop reason: HOSPADM

## 2023-04-02 RX ORDER — LEVOTHYROXINE SODIUM 0.15 MG/1
150 TABLET ORAL
Status: DISCONTINUED | OUTPATIENT
Start: 2023-04-03 | End: 2023-04-03 | Stop reason: HOSPADM

## 2023-04-02 RX ORDER — LOSARTAN POTASSIUM 50 MG/1
50 TABLET ORAL DAILY
Status: DISCONTINUED | OUTPATIENT
Start: 2023-04-02 | End: 2023-04-03 | Stop reason: HOSPADM

## 2023-04-02 RX ORDER — POTASSIUM CHLORIDE 20 MEQ/1
40 TABLET, EXTENDED RELEASE ORAL ONCE
Status: COMPLETED | OUTPATIENT
Start: 2023-04-02 | End: 2023-04-02

## 2023-04-02 RX ORDER — ACETAMINOPHEN 325 MG/1
650 TABLET ORAL EVERY 6 HOURS PRN
Status: DISCONTINUED | OUTPATIENT
Start: 2023-04-02 | End: 2023-04-03 | Stop reason: HOSPADM

## 2023-04-02 RX ORDER — LEVOTHYROXINE SODIUM 175 UG/1
175 TABLET ORAL
Status: DISCONTINUED | OUTPATIENT
Start: 2023-04-08 | End: 2023-04-03 | Stop reason: HOSPADM

## 2023-04-02 RX ORDER — DILTIAZEM HYDROCHLORIDE 5 MG/ML
15 INJECTION INTRAVENOUS ONCE
Status: COMPLETED | OUTPATIENT
Start: 2023-04-02 | End: 2023-04-02

## 2023-04-02 RX ORDER — ONDANSETRON 2 MG/ML
4 INJECTION INTRAMUSCULAR; INTRAVENOUS EVERY 6 HOURS PRN
Status: DISCONTINUED | OUTPATIENT
Start: 2023-04-02 | End: 2023-04-03 | Stop reason: HOSPADM

## 2023-04-02 RX ORDER — METOPROLOL SUCCINATE 50 MG/1
50 TABLET, EXTENDED RELEASE ORAL DAILY
Status: DISCONTINUED | OUTPATIENT
Start: 2023-04-02 | End: 2023-04-03 | Stop reason: HOSPADM

## 2023-04-02 RX ORDER — LANOLIN ALCOHOL/MO/W.PET/CERES
400 CREAM (GRAM) TOPICAL 2 TIMES DAILY
Status: DISCONTINUED | OUTPATIENT
Start: 2023-04-02 | End: 2023-04-03 | Stop reason: HOSPADM

## 2023-04-02 RX ORDER — ROSUVASTATIN CALCIUM 5 MG/1
5 TABLET, COATED ORAL
COMMUNITY

## 2023-04-02 RX ORDER — NITROGLYCERIN 0.4 MG/1
0.4 TABLET SUBLINGUAL
Status: DISCONTINUED | OUTPATIENT
Start: 2023-04-02 | End: 2023-04-03 | Stop reason: HOSPADM

## 2023-04-02 RX ORDER — SODIUM CHLORIDE 9 MG/ML
3 INJECTION INTRAVENOUS
Status: DISCONTINUED | OUTPATIENT
Start: 2023-04-02 | End: 2023-04-03 | Stop reason: HOSPADM

## 2023-04-02 RX ORDER — ACETAMINOPHEN 500 MG
1000 TABLET ORAL
COMMUNITY

## 2023-04-02 RX ORDER — INSULIN LISPRO 100 [IU]/ML
1-6 INJECTION, SOLUTION INTRAVENOUS; SUBCUTANEOUS
Status: DISCONTINUED | OUTPATIENT
Start: 2023-04-02 | End: 2023-04-03 | Stop reason: HOSPADM

## 2023-04-02 RX ORDER — FUROSEMIDE 40 MG/1
40 TABLET ORAL DAILY
COMMUNITY

## 2023-04-02 RX ORDER — MAGNESIUM SULFATE HEPTAHYDRATE 40 MG/ML
2 INJECTION, SOLUTION INTRAVENOUS ONCE
Status: COMPLETED | OUTPATIENT
Start: 2023-04-02 | End: 2023-04-02

## 2023-04-02 RX ORDER — ASPIRIN 81 MG/1
81 TABLET ORAL
COMMUNITY

## 2023-04-02 RX ORDER — CANDESARTAN 32 MG/1
32 TABLET ORAL DAILY
COMMUNITY

## 2023-04-02 RX ADMIN — POTASSIUM CHLORIDE 40 MEQ: 20 TABLET, EXTENDED RELEASE ORAL at 15:10

## 2023-04-02 RX ADMIN — PANTOPRAZOLE SODIUM 20 MG: 20 TABLET, DELAYED RELEASE ORAL at 15:17

## 2023-04-02 RX ADMIN — METOPROLOL SUCCINATE 50 MG: 50 TABLET, EXTENDED RELEASE ORAL at 15:17

## 2023-04-02 RX ADMIN — EZETIMIBE 10 MG: 10 TABLET ORAL at 21:56

## 2023-04-02 RX ADMIN — DILTIAZEM HYDROCHLORIDE 5 MG/HR: 5 INJECTION INTRAVENOUS at 13:59

## 2023-04-02 RX ADMIN — DILTIAZEM HYDROCHLORIDE 15 MG: 5 INJECTION INTRAVENOUS at 12:17

## 2023-04-02 RX ADMIN — MAGNESIUM SULFATE HEPTAHYDRATE 2 G: 40 INJECTION, SOLUTION INTRAVENOUS at 12:46

## 2023-04-02 RX ADMIN — APIXABAN 5 MG: 5 TABLET, FILM COATED ORAL at 17:08

## 2023-04-02 RX ADMIN — Medication 400 MG: at 17:06

## 2023-04-02 NOTE — ASSESSMENT & PLAN NOTE
Patient presented to the emergency room with palpitation,  Patient was found atrial flutter with heart rate 130s  As per patient, she was having the symptoms which comes and goes since this morning  Patient found hypomagnesemia,-replace magnesium  Keep potassium more than 4  Patient is getting treatment for hypothyroidism, dose simply adjusted, will continue same regimen  Patient received IV Cardizem, then patient placed on Cardizem drip after that patient converted back to sinus rhythm-continue to monitor  Patient beta-blocker dose increased  Patient placed on Eliquis  Follow echocardiogram  Consider outpatient sleep study

## 2023-04-02 NOTE — H&P
[address]  H&P  Name: Britni Langston  MRN: [id number]  Unit/Bed#: ED 07 I Date of Admission: 4/2/2023   Date of Service: 4/2/2023 I Hospital Day: 0      Assessment/Plan   * Atrial flutter [facility])  Assessment & Plan  Patient presented to the emergency room with palpitation,  Patient was found atrial flutter with heart rate 130s  As per patient, she was having the symptoms which comes and goes since this morning  Patient found hypomagnesemia,-replace magnesium  Keep potassium more than 4  Patient is getting treatment for hypothyroidism, dose simply adjusted, will continue same regimen  Patient received IV Cardizem, then patient placed on Cardizem drip after that patient converted back to sinus rhythm-continue to monitor  Patient beta-blocker dose increased  Patient placed on Eliquis  Follow echocardiogram  Consider outpatient sleep study    Hypomagnesemia  Assessment & Plan  Museum level is 1 7  Status post supplement  Recheck    Morbid obesity with BMI of 40 0-44 9, adult [facility])  Assessment & Plan  Lifestyle modification  And also has hypothyroidism, getting treatment with levothyroxine, continue    Acquired hypothyroidism  Assessment & Plan  As per patient, recently dose adjusted, patient was advised to take 150 mcg of levothyroxine from Monday to Friday, and 175 mcg on Saturday and Sunday  TSH was 0 060  Since recently had dose adjusted, will continue same regimen    Type 2 diabetes mellitus with hyperglycemia, without long-term current use of insulin ([facility] )  Assessment & Plan  Lab Results   Component Value Date    HGBA1C 9 3 (H) 06/05/2021       No results for input(s): POCGLU in the last 72 hours  Blood Sugar Average: Last 72 hrs:  Check A1c level  Lipid panel  Sliding scale, hypoglycemia precaution       VTE Pharmacologic Prophylaxis: VTE Score: 4 Moderate Risk (Score 3-4) - Pharmacological DVT Prophylaxis Ordered: apixaban (Eliquis)    Code Status: Level 1 - Full Code as per patient  Discussion with family: Updated  () at bedside  Anticipated Length of Stay: Patient will be admitted on an inpatient basis with an anticipated length of stay of greater than 2 midnights secondary to To monitor above condition  Total Time Spent on Date of Encounter in care of patient: 20 minutes This time was spent on one or more of the following: performing physical exam; counseling and coordination of care; obtaining or reviewing history; documenting in the medical record; reviewing/ordering tests, medications or procedures; communicating with other healthcare professionals and discussing with patient's family/caregivers  Chief Complaint: Palpitation    History of Present Illness:  Jenna Bran is a 77 y o  female with a PMH of diabetes, CAD, diabetes, hypertension, obesity who presents with palpitation, which started since this morning when patient was resting, patient reports she was feeling the her heart was pacing, denies any dizziness, lightheadedness, reports the symptoms as occurring frequently comes and goes  Lasted few minutes  Is any dizziness, denies any recent runny nose, cough, congestion, any recent infection  Patient does snore, but does not know if she has any sleep apnea not  Review of Systems:  Review of Systems   Constitutional: Positive for activity change  Negative for appetite change, chills, diaphoresis, fatigue, fever and unexpected weight change  HENT: Negative for congestion, ear discharge, ear pain, sinus pressure, sneezing, sore throat and trouble swallowing  Respiratory: Negative for apnea, cough, choking, shortness of breath, wheezing and stridor  Cardiovascular: Positive for palpitations  Negative for chest pain and leg swelling  Gastrointestinal: Negative for abdominal distention, abdominal pain, anal bleeding and blood in stool  Genitourinary: Negative for difficulty urinating, dyspareunia and dysuria     Musculoskeletal: Negative for arthralgias, back pain and gait problem  Skin: Negative for color change, pallor, rash and wound  Neurological: Negative for dizziness, facial asymmetry, light-headedness and headaches  Hematological: Negative for adenopathy  Psychiatric/Behavioral: Negative for agitation and behavioral problems  All other systems reviewed and are negative  Past Medical and Surgical History:   Past Medical History:   Diagnosis Date   • Diabetes mellitus ([facility] )    • Disease of thyroid gland    • MI (myocardial infarction) ([facility] )        Past Surgical History:   Procedure Laterality Date   • CARDIAC SURGERY      stent        Meds/Allergies:  Prior to Admission medications    Medication Sig Start Date End Date Taking? Authorizing Provider   ezetimibe (ZETIA) 10 mg tablet 10 mg 4/9/21   Historical Provider, MD   levothyroxine (Synthroid) 150 mcg tablet 175 mcg    Historical Provider, MD   losartan (COZAAR) 50 mg tablet 50 mg 1/28/21   Historical Provider, MD   metFORMIN (GLUCOPHAGE) 500 mg tablet 500 mg 4/6/21 12/2/21  Historical Provider, MD   metoprolol succinate (TOPROL-XL) 25 mg 24 hr tablet 25 mg 7/2/20 6/27/21  Historical Provider, MD   nitroglycerin (NITROSTAT) 0 4 mg SL tablet     Historical Provider, MD   pantoprazole (PROTONIX) 20 mg tablet Take 20 mg by mouth daily 3/14/21   Historical Provider, MD     I have reviewed home medications with patient personally      Allergies: No Known Allergies    Social History:  Marital Status: /Civil Union   Occupation: Unknown  Patient Pre-hospital Living Situation: Home  Patient Pre-hospital Level of Mobility: walks  Patient Pre-hospital Diet Restrictions: Cardiac/diabetic diet  Substance Use History:   Social History     Substance and Sexual Activity   Alcohol Use Yes     Social History     Tobacco Use   Smoking Status Never   Smokeless Tobacco Never     Social History     Substance and Sexual Activity   Drug Use Not Currently       Family History:  Family "history of diabetes    Physical Exam:     Vitals:   Blood Pressure: 127/71 (04/02/23 1430)  Pulse: 75 (04/02/23 1430)  Temperature: 98 1 °F (36 7 °C) (04/02/23 1217)  Temp Source: Temporal (04/02/23 1217)  Respirations: (!) 28 (04/02/23 1430)  Height: 5' 8\" (172 7 cm) (04/02/23 1200)  Weight - Scale: 131 kg (288 lb 2 3 oz) (04/02/23 1200)  SpO2: 95 % (04/02/23 1430)    Physical Exam  Vitals and nursing note reviewed  Constitutional:       Appearance: Normal appearance  She is obese  She is not ill-appearing or diaphoretic  HENT:      Mouth/Throat:      Mouth: Mucous membranes are moist       Pharynx: No oropharyngeal exudate or posterior oropharyngeal erythema  Eyes:      General: No scleral icterus  Left eye: No discharge  Extraocular Movements: Extraocular movements intact  Conjunctiva/sclera: Conjunctivae normal       Pupils: Pupils are equal, round, and reactive to light  Cardiovascular:      Rate and Rhythm: Normal rate  Pulses: Normal pulses  Heart sounds: Normal heart sounds  No murmur heard  No friction rub  No gallop  Pulmonary:      Effort: Pulmonary effort is normal  No respiratory distress  Breath sounds: No stridor  No wheezing or rhonchi  Abdominal:      General: Abdomen is flat  Bowel sounds are normal  There is no distension  Palpations: There is no mass  Tenderness: There is no abdominal tenderness  Hernia: No hernia is present  Musculoskeletal:      Right lower leg: No edema  Left lower leg: No edema  Skin:     General: Skin is warm  Neurological:      General: No focal deficit present  Mental Status: She is alert and oriented to person, place, and time  Cranial Nerves: No cranial nerve deficit  Sensory: No sensory deficit  Motor: No weakness        Coordination: Coordination normal    Psychiatric:         Mood and Affect: Mood normal          Additional Data:     Lab Results:  Results from last 7 days   Lab " Units 04/02/23  1210   WBC Thousand/uL 9 78   HEMOGLOBIN g/dL 15 8*   HEMATOCRIT % 49 2*   PLATELETS Thousands/uL 235   NEUTROS PCT % 59   LYMPHS PCT % 30   MONOS PCT % 8   EOS PCT % 3     Results from last 7 days   Lab Units 04/02/23  1210   SODIUM mmol/L 139   POTASSIUM mmol/L 3 7   CHLORIDE mmol/L 102   CO2 mmol/L 26   BUN mg/dL 22   CREATININE mg/dL 1 16   ANION GAP mmol/L 11   CALCIUM mg/dL 9 4   ALBUMIN g/dL 4 3   TOTAL BILIRUBIN mg/dL 0 62   ALK PHOS U/L 60   ALT U/L 16   AST U/L 18   GLUCOSE RANDOM mg/dL 125                       Lines/Drains:  Invasive Devices     Peripheral Intravenous Line  Duration           Peripheral IV 04/02/23 Left;Ventral (anterior) Hand <1 day    Peripheral IV 04/02/23 Right;Ventral (anterior) Forearm <1 day                    Imaging: Reviewed radiology reports from this admission including: chest xray  X-ray chest 2 views   ED Interpretation by Marco Pond MD (04/02 0381)   No acute cardiopulm abnl          EKG and Other Studies Reviewed on Admission:   · EKG: Initial EKG shows atrial flutter with 2:1 conduction, heart rate 136  ** Please Note: This note has been constructed using a voice recognition system   **

## 2023-04-02 NOTE — ASSESSMENT & PLAN NOTE
As per patient, recently dose adjusted, patient was advised to take 150 mcg of levothyroxine from Monday to Friday, and 175 mcg on Saturday and Sunday  TSH was 0 060  Since recently had dose adjusted, will continue same regimen

## 2023-04-02 NOTE — ED PROVIDER NOTES
History  Chief Complaint   Patient presents with   • Chest Pain     Chest pain that started at 0930 this am, pt describes as pressure in chest  Pt took 2 nitro prior to EMS arrival      HPI   68F w hx of DM2, HTN, obesity, CAD w 1 stent presenting with chest pain  Today, patient developed chest heaviness and felt her heart was racing  Noticed her heart was racing in the rates of 100-150s  Says she has never felt her heart racing before  Feels like her heart rate is going in and out  No hx of atrial fibrillation or atrial flutter  She took 2x nitro and 1x aspirin 81mg prior to EMS arrival  Hx of cardiac cath w stent in 2017  Denies fevers, shortness of breath  Prior to Admission Medications   Prescriptions Last Dose Informant Patient Reported? Taking?   ezetimibe (ZETIA) 10 mg tablet   Yes No   Sig: 10 mg   levothyroxine (Synthroid) 150 mcg tablet   Yes No   Si mcg   losartan (COZAAR) 50 mg tablet   Yes No   Si mg   metFORMIN (GLUCOPHAGE) 500 mg tablet   Yes No   Si mg   metoprolol succinate (TOPROL-XL) 25 mg 24 hr tablet   Yes No   Si mg   nitroglycerin (NITROSTAT) 0 4 mg SL tablet   Yes No   pantoprazole (PROTONIX) 20 mg tablet   Yes No   Sig: Take 20 mg by mouth daily      Facility-Administered Medications: None       Past Medical History:   Diagnosis Date   • Diabetes mellitus (HCC)    • Disease of thyroid gland    • MI (myocardial infarction) ([facility] [address] )        Past Surgical History:   Procedure Laterality Date   • CARDIAC SURGERY      stent        Family History   Problem Relation Age of Onset   • Cancer Mother    • Heart disease Father      I have reviewed and agree with the history as documented  E-Cigarette/Vaping   • E-Cigarette Use Never User      E-Cigarette/Vaping Substances     Social History     Tobacco Use   • Smoking status: Never   • Smokeless tobacco: Never   Vaping Use   • Vaping Use: Never used   Substance Use Topics   • Alcohol use:  Yes   • Drug use: Not Currently Review of Systems   Constitutional: Negative for chills and fever  HENT: Negative for ear pain and sore throat  Eyes: Negative for pain and visual disturbance  Respiratory: Positive for chest tightness  Negative for cough and shortness of breath  Cardiovascular: Positive for chest pain  Negative for palpitations  Gastrointestinal: Negative for abdominal pain and vomiting  Genitourinary: Negative for dysuria and hematuria  Musculoskeletal: Negative for arthralgias and back pain  Skin: Negative for color change and rash  Neurological: Negative for seizures and syncope  All other systems reviewed and are negative  Physical Exam  Physical Exam  Vitals and nursing note reviewed  Constitutional:       General: She is not in acute distress  Appearance: She is well-developed  She is obese  HENT:      Head: Normocephalic and atraumatic  Right Ear: External ear normal       Left Ear: External ear normal       Nose: Nose normal    Eyes:      Conjunctiva/sclera: Conjunctivae normal    Cardiovascular:      Rate and Rhythm: Tachycardia present  Rhythm irregularly irregular  Pulmonary:      Effort: Pulmonary effort is normal  No respiratory distress  Breath sounds: Normal breath sounds  Abdominal:      Palpations: Abdomen is soft  Tenderness: There is no abdominal tenderness  Musculoskeletal:      Cervical back: Normal range of motion and neck supple  Right lower leg: No edema  Left lower leg: No edema  Skin:     General: Skin is warm and dry  Neurological:      General: No focal deficit present  Mental Status: She is alert  Mental status is at baseline           Vital Signs  ED Triage Vitals   Temperature Pulse Respirations Blood Pressure SpO2   04/02/23 1217 04/02/23 1200 04/02/23 1200 04/02/23 1200 04/02/23 1200   98 1 °F (36 7 °C) 96 16 (!) 194/91 97 %      Temp Source Heart Rate Source Patient Position - Orthostatic VS BP Location FiO2 (%) 04/02/23 1217 04/02/23 1200 04/02/23 1200 04/02/23 1200 --   Temporal Monitor Lying Left arm       Pain Score       04/02/23 1200       4           Vitals:    04/02/23 1300 04/02/23 1330 04/02/23 1415 04/02/23 1430   BP: 131/63 118/64 133/73 127/71   Pulse: (!) 132 (!) 129 74 75   Patient Position - Orthostatic VS: Lying Lying Lying          Visual Acuity      ED Medications  Medications   sodium chloride (PF) 0 9 % injection 3 mL (has no administration in time range)   ezetimibe (ZETIA) tablet 10 mg (has no administration in time range)   levothyroxine tablet 150 mcg (has no administration in time range)   losartan (COZAAR) tablet 50 mg (has no administration in time range)   metoprolol succinate (TOPROL-XL) 24 hr tablet 50 mg (has no administration in time range)   nitroglycerin (NITROSTAT) SL tablet 0 4 mg (has no administration in time range)   pantoprazole (PROTONIX) EC tablet 20 mg (has no administration in time range)   levothyroxine tablet 175 mcg (has no administration in time range)   acetaminophen (TYLENOL) tablet 650 mg (has no administration in time range)   docusate sodium (COLACE) capsule 100 mg (has no administration in time range)   ondansetron (ZOFRAN) injection 4 mg (has no administration in time range)   aspirin (ECOTRIN LOW STRENGTH) EC tablet 81 mg (has no administration in time range)   apixaban (ELIQUIS) tablet 5 mg (has no administration in time range)   magnesium Oxide (MAG-OX) tablet 400 mg (has no administration in time range)   insulin lispro (HumaLOG) 100 units/mL subcutaneous injection 1-6 Units (has no administration in time range)   insulin lispro (HumaLOG) 100 units/mL subcutaneous injection 1-6 Units (has no administration in time range)   potassium chloride (K-DUR,KLOR-CON) CR tablet 40 mEq (has no administration in time range)   diltiazem (CARDIZEM) injection 15 mg (15 mg Intravenous Given 4/2/23 1217)   magnesium sulfate 2 g/50 mL IVPB (premix) 2 g (0 g Intravenous Stopped 4/2/23 1355)   diltiazem (CARDIZEM) 125 mg in sodium chloride 0 9 % 125 mL infusion (0 mg/hr Intravenous Stopped 4/2/23 1433)       Diagnostic Studies  Results Reviewed     Procedure Component Value Units Date/Time    Hemoglobin A1C [[id number]]     Lab Status: No result Specimen: Blood     HS Troponin I 2hr [[id number]]  (Normal) Collected: 04/02/23 1409    Lab Status: Final result Specimen: Blood from Arm, Right Updated: 04/02/23 1439     hs TnI 2hr 7 ng/L      Delta 2hr hsTnI 0 ng/L     HS Troponin I 4hr [[id number]]     Lab Status: No result Specimen: Blood     T4, free [[id number]] Collected: 04/02/23 1210    Lab Status: In process Specimen: Blood from Arm, Left Updated: 04/02/23 1255    T3, free [[id number]] Collected: 04/02/23 1210    Lab Status:  In process Specimen: Blood from Arm, Left Updated: 04/02/23 1255    TSH [[id number]]  (Abnormal) Collected: 04/02/23 1210    Lab Status: Final result Specimen: Blood from Arm, Left Updated: 04/02/23 1252     TSH 3RD GENERATON 0 060 uIU/mL     HS Troponin 0hr (reflex protocol) [[id number]]  (Normal) Collected: 04/02/23 1210    Lab Status: Final result Specimen: Blood from Arm, Left Updated: 04/02/23 1241     hs TnI 0hr 7 ng/L     Comprehensive metabolic panel [[id number]] Collected: 04/02/23 1210    Lab Status: Final result Specimen: Blood from Arm, Left Updated: 04/02/23 1234     Sodium 139 mmol/L      Potassium 3 7 mmol/L      Chloride 102 mmol/L      CO2 26 mmol/L      ANION GAP 11 mmol/L      BUN 22 mg/dL      Creatinine 1 16 mg/dL      Glucose 125 mg/dL      Calcium 9 4 mg/dL      AST 18 U/L      ALT 16 U/L      Alkaline Phosphatase 60 U/L      Total Protein 7 4 g/dL      Albumin 4 3 g/dL      Total Bilirubin 0 62 mg/dL      eGFR 47 ml/min/1 73sq m     Narrative:      [place] guidelines for Chronic Kidney Disease (CKD):   •  Stage 1 with normal or high GFR (GFR > 90 mL/min/1 73 square meters)  •  Stage 2 Mild CKD (GFR = 60-89 mL/min/1 73 square meters)  •  Stage 3A Moderate CKD (GFR = 45-59 mL/min/1 73 square meters)  •  Stage 3B Moderate CKD (GFR = 30-44 mL/min/1 73 square meters)  •  Stage 4 Severe CKD (GFR = 15-29 mL/min/1 73 square meters)  •  Stage 5 End Stage CKD (GFR <15 mL/min/1 73 square meters)  Note: GFR calculation is accurate only with a steady state creatinine    Magnesium [[id number]]  (Abnormal) Collected: 04/02/23 1210    Lab Status: Final result Specimen: Blood from Arm, Left Updated: 04/02/23 1234     Magnesium 1 7 mg/dL     CBC and differential [[id number]]  (Abnormal) Collected: 04/02/23 1210    Lab Status: Final result Specimen: Blood from Arm, Left Updated: 04/02/23 1216     WBC 9 78 Thousand/uL      RBC 5 90 Million/uL      Hemoglobin 15 8 g/dL      Hematocrit 49 2 %      MCV 83 fL      MCH 26 8 pg      MCHC 32 1 g/dL      RDW 13 7 %      MPV 9 5 fL      Platelets 584 Thousands/uL      nRBC 0 /100 WBCs      Neutrophils Relative 59 %      Immat GRANS % 0 %      Lymphocytes Relative 30 %      Monocytes Relative 8 %      Eosinophils Relative 3 %      Basophils Relative 0 %      Neutrophils Absolute 5 71 Thousands/µL      Immature Grans Absolute 0 02 Thousand/uL      Lymphocytes Absolute 2 94 Thousands/µL      Monocytes Absolute 0 77 Thousand/µL      Eosinophils Absolute 0 30 Thousand/µL      Basophils Absolute 0 04 Thousands/µL                X-ray chest 2 views   ED Interpretation by Zakiya Chavarria MD (04/02 1355)   No acute cardiopulm abnl               Procedures  ECG 12 Lead Documentation Only    Date/Time: 4/2/2023 12:17 PM  Performed by: Zakiya Chavarria MD  Authorized by: Zakiya Chavarria MD     Rate:     ECG rate:  136    ECG rate assessment: tachycardic    Rhythm:     Rhythm: atrial flutter    Conduction:     Conduction: abnormal      Abnormal conduction: non-specific intraventricular conduction delay    ST segments:     ST segments:  Non-specific  T waves:     T waves: non-specific      CriticalCare Time    Date/Time: 4/2/2023 2:06 PM  Performed by: Shonda Zapata MD  Authorized by: Shonda Zapata MD     Critical care provider statement:     Critical care time (minutes):  31    Critical care was necessary to treat or prevent imminent or life-threatening deterioration of the following conditions:  Cardiac failure and metabolic crisis    Critical care was time spent personally by me on the following activities:  Obtaining history from patient or surrogate, ordering and performing treatments and interventions, ordering and review of laboratory studies, development of treatment plan with patient or surrogate, ordering and review of radiographic studies, re-evaluation of patient's condition, evaluation of patient's response to treatment, examination of patient and review of old charts         ED Course  ED Course as of 04/02/23 1450   Sun Apr 02, 2023   1205 Patient converted to normal sinus rhythm  Repeat EKG shows NSR at 89bpm  No ST elevations or depressions  Nonspecific T wave changes  1210 Patient's heart rate persistently converting to aflutter/afib and normal sinus every few seconds  1217 WBC: 9 78   1217 Hemoglobin(!): 15 8   1217 Patient's heart rate more sustained now in the 150s  Will give diltiazem push  1220 Heart rate now decreased to 90s, however jumping to 100s  Will continue to monitor  1229 Heart rate now 140s, appears to be afib w RVR on the monitor  1229 Heart rate now 84, normal sinus  1235 Magnesium(!): 1 7  Will replete with IV magnesium  1242 hs TnI 0hr: 7   1253 TSH 3RD GENERATON(!): 0 060  T4 and T3 added on  Patient states she knows her TSH is low and her levothyroxine dosing was just changed  She has not had a chance to  her new prescriptions for the levothyroxine as it is the weekend  1315 Heart rate more sustained now over the past few minutes - afib/aflutter with heart rates of 140s  Will start diltiazem infusion  Order placed     1340 I discussed case with hospitalist, Dr Nikole Madden  He accepts patient for admission to stepdown 2    1345 Patient now back to normal sinus rhythm  Holding on the infusion  1350 Now back to aflutter w RVR  1415 Repeat EKG shows normal sinus rhythm at 76bpm  No ST elevations or depressions  Unsure if patient will remain in normal sinus rhythm  Will continue to monitor  [address], Smith Player at bedside  Patient continues to be in normal sinus rhythm  Appears to be sustained and converted at this time  Will stop diltiazem infusion  Hospitalist will keep overnight for monitoring  Admission changed to med surg + tele  Medical Decision Making  68F presenting with chest pain  On initial evaluation, patient is tachycardic to 130s  EKG reveals atrial flutter w RVR  Immediately after the EKG was done, patient spontaneously converted to normal sinus rhythm  However, after a few minutes of monitoring, patient converted back to aflutter/afib seen on the monitor  Heart rate remains labile  Ddxs include hyperthyroidism vs CAD vs electrolyte abnl vs infection  Labwork significant for hypomagnesemia of 1 7  Patient given IV magnesium for repletion  CMP wnl  Troponin of 7  TSH low at 0 060; patient aware of this and says she had medication changed, but has not implemented the change  Patient was given diltiazem push  Normal sinus rhythm not sustained; diltiazem infusion ordered  Patient discussed w medicine and admitted for further management  Atrial flutter ([facility] [address] ): acute illness or injury  History of hypothyroidism: chronic illness or injury  Hypomagnesemia: acute illness or injury  Amount and/or Complexity of Data Reviewed  Labs: ordered  Decision-making details documented in ED Course  Radiology: ordered and independent interpretation performed  ECG/medicine tests: ordered and independent interpretation performed  Risk  Prescription drug management  Decision regarding hospitalization        Disposition  Final diagnoses:   Atrial flutter ([facility] [address] )   Hypomagnesemia History of hypothyroidism     Time reflects when diagnosis was documented in both MDM as applicable and the Disposition within this note     Time User Action Codes Description Comment    4/2/2023 12:25 PM Pedro Cluck Add [I48 92] Atrial flutter ([address] )     4/2/2023 12:35 PM Pedro Cluck Add [E83 42] Hypomagnesemia     4/2/2023  1:00 PM Pedro Cluck Add [E05 90] Hyperthyroidism     4/2/2023  1:56 PM Pedro Cluck Remove [E05 90] Hyperthyroidism     4/2/2023  1:57 PM Pedro Cluck Add [Z86 39] History of hypothyroidism       ED Disposition     ED Disposition   Admit    Condition   Stable    Date/Time   Sun Apr 2, 2023  1:40 PM    Comment   Case was discussed with Dr Jolie Yarbrough and the patient's admission status was agreed to be Admission Status: inpatient status to the service of Dr Jolie Yarbrough            Follow-up Information    None         Patient's Medications   Discharge Prescriptions    No medications on file       No discharge procedures on file      PDMP Review       Value Time User    PDMP Reviewed  Yes 4/2/2023  2:41 PM Anne Henson MD          ED Provider  Electronically Signed by           Nitesh Mckeon MD  04/02/23 [address], [place]  04/02/23 3592

## 2023-04-02 NOTE — ASSESSMENT & PLAN NOTE
Lab Results   Component Value Date    HGBA1C 9 3 (H) 06/05/2021       No results for input(s): POCGLU in the last 72 hours      Blood Sugar Average: Last 72 hrs:  Check A1c level  Lipid panel  Sliding scale, hypoglycemia precaution

## 2023-04-03 ENCOUNTER — APPOINTMENT (INPATIENT)
Dept: NON INVASIVE DIAGNOSTICS | Facility: HOSPITAL | Age: 68
End: 2023-04-03

## 2023-04-03 VITALS
OXYGEN SATURATION: 93 % | HEIGHT: 68 IN | RESPIRATION RATE: 19 BRPM | DIASTOLIC BLOOD PRESSURE: 59 MMHG | SYSTOLIC BLOOD PRESSURE: 127 MMHG | BODY MASS INDEX: 42.77 KG/M2 | TEMPERATURE: 97.9 F | WEIGHT: 282.19 LBS | HEART RATE: 57 BPM

## 2023-04-03 LAB
ANION GAP SERPL CALCULATED.3IONS-SCNC: 9 MMOL/L (ref 4–13)
AORTIC ROOT: 3.1 CM
AORTIC VALVE MEAN VELOCITY: 7.6 M/S
APICAL FOUR CHAMBER EJECTION FRACTION: 67 %
ASCENDING AORTA: 3.2 CM
ATRIAL RATE: 68 BPM
AV MEAN GRADIENT: 3 MMHG
AV PEAK GRADIENT: 6 MMHG
BUN SERPL-MCNC: 23 MG/DL (ref 5–25)
CALCIUM SERPL-MCNC: 8.9 MG/DL (ref 8.4–10.2)
CHLORIDE SERPL-SCNC: 104 MMOL/L (ref 96–108)
CHOLEST SERPL-MCNC: 134 MG/DL
CO2 SERPL-SCNC: 24 MMOL/L (ref 21–32)
CREAT SERPL-MCNC: 1.12 MG/DL (ref 0.6–1.3)
DOP CALC AO PEAK VEL: 1.19 M/S
DOP CALC AO VTI: 28.19 CM
DOP CALC LVOT AREA: 4.15 CM2
DOP CALC LVOT DIAMETER: 2.3 CM
E WAVE DECELERATION TIME: 276 MS
FRACTIONAL SHORTENING: 22 (ref 28–44)
GFR SERPL CREATININE-BSD FRML MDRD: 49 ML/MIN/1.73SQ M
GLUCOSE SERPL-MCNC: 108 MG/DL (ref 65–140)
GLUCOSE SERPL-MCNC: 121 MG/DL (ref 65–140)
GLUCOSE SERPL-MCNC: 124 MG/DL (ref 65–140)
HDLC SERPL-MCNC: 45 MG/DL
INTERVENTRICULAR SEPTUM IN DIASTOLE (PARASTERNAL SHORT AXIS VIEW): 1.1 CM
INTERVENTRICULAR SEPTUM: 1.1 CM (ref 0.6–1.1)
LDLC SERPL CALC-MCNC: 63 MG/DL (ref 0–100)
LEFT ATRIUM SIZE: 3.1 CM
LEFT INTERNAL DIMENSION IN SYSTOLE: 2.8 CM (ref 2.1–4)
LEFT VENTRICLE DIASTOLIC VOLUME (MOD BIPLANE): 88 ML
LEFT VENTRICLE SYSTOLIC VOLUME (MOD BIPLANE): 33 ML
LEFT VENTRICULAR INTERNAL DIMENSION IN DIASTOLE: 3.6 CM (ref 3.5–6)
LEFT VENTRICULAR POSTERIOR WALL IN END DIASTOLE: 1.1 CM
LEFT VENTRICULAR STROKE VOLUME: 26 ML
LV EF: 62 %
LVSV (TEICH): 26 ML
MAGNESIUM SERPL-MCNC: 2.1 MG/DL (ref 1.9–2.7)
MV E'TISSUE VEL-LAT: 10 CM/S
MV E'TISSUE VEL-SEP: 6 CM/S
MV PEAK A VEL: 0.87 M/S
MV PEAK E VEL: 52 CM/S
MV STENOSIS PRESSURE HALF TIME: 80 MS
MV VALVE AREA P 1/2 METHOD: 2.75
NONHDLC SERPL-MCNC: 89 MG/DL
P AXIS: 44 DEGREES
POTASSIUM SERPL-SCNC: 4 MMOL/L (ref 3.5–5.3)
PR INTERVAL: 158 MS
PV PEAK GRADIENT: 3 MMHG
QRS AXIS: 48 DEGREES
QRSD INTERVAL: 92 MS
QT INTERVAL: 446 MS
QTC INTERVAL: 474 MS
RA PRESSURE ESTIMATED: 3 MMHG
RV PSP: 21 MMHG
SL CV LV EF: 62
SL CV PED ECHO LEFT VENTRICLE DIASTOLIC VOLUME (MOD BIPLANE) 2D: 56 ML
SL CV PED ECHO LEFT VENTRICLE SYSTOLIC VOLUME (MOD BIPLANE) 2D: 30 ML
SODIUM SERPL-SCNC: 137 MMOL/L (ref 135–147)
T WAVE AXIS: 57 DEGREES
TR MAX PG: 18 MMHG
TR PEAK VELOCITY: 2.1 M/S
TRICUSPID VALVE PEAK REGURGITATION VELOCITY: 2.12 M/S
TRIGL SERPL-MCNC: 129 MG/DL
VENTRICULAR RATE: 68 BPM

## 2023-04-03 RX ORDER — METOPROLOL SUCCINATE 25 MG/1
50 TABLET, EXTENDED RELEASE ORAL DAILY
Qty: 60 TABLET | Refills: 2 | Status: SHIPPED | OUTPATIENT
Start: 2023-04-03

## 2023-04-03 RX ADMIN — APIXABAN 5 MG: 5 TABLET, FILM COATED ORAL at 08:45

## 2023-04-03 RX ADMIN — LEVOTHYROXINE SODIUM 150 MCG: 150 TABLET ORAL at 08:44

## 2023-04-03 RX ADMIN — METOPROLOL SUCCINATE 50 MG: 50 TABLET, EXTENDED RELEASE ORAL at 08:44

## 2023-04-03 RX ADMIN — Medication 400 MG: at 08:45

## 2023-04-03 RX ADMIN — PANTOPRAZOLE SODIUM 20 MG: 20 TABLET, DELAYED RELEASE ORAL at 08:44

## 2023-04-03 RX ADMIN — PERFLUTREN 2 ML/MIN: 6.52 INJECTION, SUSPENSION INTRAVENOUS at 09:44

## 2023-04-03 RX ADMIN — ASPIRIN 81 MG: 81 TABLET, COATED ORAL at 08:44

## 2023-04-03 RX ADMIN — DOCUSATE SODIUM 100 MG: 100 CAPSULE ORAL at 08:45

## 2023-04-03 RX ADMIN — LOSARTAN POTASSIUM 50 MG: 50 TABLET, FILM COATED ORAL at 08:44

## 2023-04-03 NOTE — DISCHARGE INSTR - AVS FIRST PAGE
Dear Selina Jones,     It was our pleasure to care for you here at [address]  For follow up as well as any medication refills, we recommend that you follow up with your primary care physician  Here are the most important instructions/ recommendations at discharge:     Notable Medication Adjustments -   Start taking Eliquis 5 mg twice daily by mouth   Change metorpolol to 50 mg daily   Important follow up information -   Follow up with your cardiologist - at scheduled appointment in 2 weeks or earlier   Please review this entire after visit summary as additional general instructions including medication list, appointments, activity, diet, any pertinent wound care, and other additional recommendations from your care team that may be provided for you        Sincerely,     Naomie Norris PA-C

## 2023-04-03 NOTE — ASSESSMENT & PLAN NOTE
Patient presented to the emergency room with palpitation,  Patient was found atrial flutter with heart rate 130s  · As per patient, she was having the symptoms which comes and goes since this morning  · Patient found hypomagnesemia,-replace magnesium  · Keep potassium more than 4  · Patient is getting treatment for hypothyroidism, dose simply adjusted, will continue same regimen  · Patient received IV Cardizem, then patient placed on Cardizem drip after that patient converted back to sinus rhythm-continue to monitor  · Patient beta-blocker dose increased - continue as outpatient   · Patient placed on Eliquis - price checked and affordable   · Follow echocardiogram -EF 62%, borderline concentric hypertrophy, systolic function normal, mildly abnormal diastolic dysfunction with grade 1 relaxation, atrial filling pressure normal    Patient has follow-up appoint with her cardiologist in approximately 2 weeks, will call to see if need to be seen sooner  Continue Eliquis until seen by cardiology as an outpatient  New elevated dose of metoprolol

## 2023-04-03 NOTE — ASSESSMENT & PLAN NOTE
Lab Results   Component Value Date    HGBA1C 6 8 (H) 04/02/2023       Recent Labs     04/02/23  1642 04/02/23  2105 04/03/23  0750 04/03/23  1117   POCGLU 100 95 121 108       Blood Sugar Average: Last 72 hrs:  (P) 106Check A1c level  Lipid panel  Sliding scale, hypoglycemia precaution    Continue home regimen

## 2023-04-03 NOTE — PLAN OF CARE
Problem: PAIN - ADULT  Goal: Verbalizes/displays adequate comfort level or baseline comfort level  Description: Interventions:  - Encourage patient to monitor pain and request assistance  - Assess pain using appropriate pain scale  - Administer analgesics based on type and severity of pain and evaluate response  - Implement non-pharmacological measures as appropriate and evaluate response  - Consider cultural and social influences on pain and pain management  - Notify physician/advanced practitioner if interventions unsuccessful or patient reports new pain  Outcome: Progressing     Problem: INFECTION - ADULT  Goal: Absence or prevention of progression during hospitalization  Description: INTERVENTIONS:  - Assess and monitor for signs and symptoms of infection  - Monitor lab/diagnostic results  - Monitor all insertion sites, i e  indwelling lines, tubes, and drains  - Monitor endotracheal if appropriate and nasal secretions for changes in amount and color  - [place] appropriate cooling/warming therapies per order  - Administer medications as ordered  - Instruct and encourage patient and family to use good hand hygiene technique  - Identify and instruct in appropriate isolation precautions for identified infection/condition  Outcome: Progressing     Problem: SAFETY ADULT  Goal: Patient will remain free of falls  Description: INTERVENTIONS:  - Educate patient/family on patient safety including physical limitations  - Instruct patient to call for assistance with activity   - Consult OT/PT to assist with strengthening/mobility   - Keep Call bell within reach  - Keep bed low and locked with side rails adjusted as appropriate  - Keep care items and personal belongings within reach  - Initiate and maintain comfort rounds  - Make Fall Risk Sign visible to staff  - Obtain necessary fall risk management equipment: non slip shoes  - Apply yellow socks and bracelet for high fall risk patients  - Consider moving patient to room near nurses station  Outcome: Progressing     Problem: CARDIOVASCULAR - ADULT  Goal: Absence of cardiac dysrhythmias or at baseline rhythm  Description: INTERVENTIONS:  - Continuous cardiac monitoring, vital signs, obtain 12 lead EKG if ordered  - Administer antiarrhythmic and heart rate control medications as ordered  - Monitor electrolytes and administer replacement therapy as ordered  Outcome: Progressing     Problem: Nutrition/Hydration-ADULT  Goal: Nutrient/Hydration intake appropriate for improving, restoring or maintaining nutritional needs  Description: Monitor and assess patient's nutrition/hydration status for malnutrition  Collaborate with interdisciplinary team and initiate plan and interventions as ordered  Monitor patient's weight and dietary intake as ordered or per policy  Utilize nutrition screening tool and intervene as necessary  Determine patient's food preferences and provide high-protein, high-caloric foods as appropriate       INTERVENTIONS:  - Monitor oral intake, urinary output, labs, and treatment plans  - Assess nutrition and hydration status and recommend course of action  - Evaluate amount of meals eaten  - Assist patient with eating if necessary   - Allow adequate time for meals  - Recommend/ encourage appropriate diets, oral nutritional supplements, and vitamin/mineral supplements  - Order, calculate, and assess calorie counts as needed  - Recommend, monitor, and adjust tube feedings and TPN/PPN based on assessed needs  - Assess need for intravenous fluids  - Provide specific nutrition/hydration education as appropriate  - Include patient/family/caregiver in decisions related to nutrition  Outcome: Progressing

## 2023-04-03 NOTE — NURSING NOTE
Pt provided discharge instructions  Pt acknowledged understanding and was discharged with all belongings

## 2023-04-03 NOTE — DISCHARGE SUMMARY
[address]  Discharge- Amarjit Clarke 1955, 77 y o  female MRN: [id number]  Unit/Bed#: -01 Encounter: [id number]  Primary Care Provider: No primary care provider on file     Date and time admitted to hospital: 4/2/2023 11:57 AM    * Atrial flutter [facility])  Assessment & Plan  Patient presented to the emergency room with palpitation,  Patient was found atrial flutter with heart rate 130s  · As per patient, she was having the symptoms which comes and goes since this morning  · Patient found hypomagnesemia,-replace magnesium  · Keep potassium more than 4  · Patient is getting treatment for hypothyroidism, dose simply adjusted, will continue same regimen  · Patient received IV Cardizem, then patient placed on Cardizem drip after that patient converted back to sinus rhythm-continue to monitor  · Patient beta-blocker dose increased - continue as outpatient   · Patient placed on Eliquis - price checked and affordable   · Follow echocardiogram -EF 62%, borderline concentric hypertrophy, systolic function normal, mildly abnormal diastolic dysfunction with grade 1 relaxation, atrial filling pressure normal    Patient has follow-up appoint with her cardiologist in approximately 2 weeks, will call to see if need to be seen sooner  Continue Eliquis until seen by cardiology as an outpatient  New elevated dose of metoprolol      Hypomagnesemia  Assessment & Plan  Museum level is 1 7  Status post supplement  Recheck improved    Morbid obesity with BMI of 40 0-44 9, adult [facility])  Assessment & Plan  Lifestyle modification  And also has hypothyroidism, getting treatment with levothyroxine, continue    Acquired hypothyroidism  Assessment & Plan  As per patient, recently dose adjusted, patient was advised to take 150 mcg of levothyroxine from Monday to Friday, and 175 mcg on Saturday and Sunday  TSH was 0 060  Since recently had dose adjusted, will continue same regimen    Type 2 diabetes mellitus with hyperglycemia, without long-term current use of insulin [facility])  Assessment & Plan  Lab Results   Component Value Date    HGBA1C 6 8 (H) 04/02/2023       Recent Labs     04/02/23  1642 04/02/23  2105 04/03/23  0750 04/03/23  1117   POCGLU 100 95 121 108       Blood Sugar Average: Last 72 hrs:  (P) 106Check A1c level  Lipid panel  Sliding scale, hypoglycemia precaution    Continue home regimen    Medical Problems     Resolved Problems  Date Reviewed: 6/5/2021   None       Discharging Physician / Practitioner: Pasha Leo PA-C  PCP: No primary care provider on file  Admission Date:   Admission Orders (From admission, onward)     Ordered        04/02/23 1450  INPATIENT ADMISSION  Once                      Discharge Date: 04/03/23    Consultations During Hospital Stay:  · None    Procedures Performed:   · None    Significant Findings / Test Results:   X-ray chest 2 views - Result Date: 4/3/2023  No acute cardiopulmonary disease  Workstation performed: [id number]     Echo complete w/ contrast if indicated - Result Date: 4/3/2023  · •  Left Ventricle: Left ventricular cavity size is normal  There is borderline concentric hypertrophy  The left ventricular ejection fraction is 62% by biplane measurement  Systolic function is normal  Global longitudinal strain is normal  Wall motion is normal  Diastolic function is mildly abnormal, consistent with grade I (abnormal) relaxation  Left atrial filling pressure is normal  •  Tricuspid Valve: The estimated right ventricular systolic pressure is 41 42 mmHg  Incidental Findings:   · None   · I reviewed the above mentioned incidental findings with the patient and/or family and they expressed understanding  Test Results Pending at Discharge (will require follow up):    · None     Outpatient Tests Requested:  · None    Complications:  none    Reason for Admission: Atrial flutter    Hospital Course:   Chi Lopez is a 77 y o  female patient with history of obesity, "hypothyroidism, type 2 diabetes CAD, hypertension who originally presented to the hospital on 4/2/2023 due to palpitations and heart racing  Resented to the ER with heart rates in 130s, EKG showing atrial flutter  Magnesium was low on admission and given replacement  Given IV Cardizem and then placed on Cardizem drip briefly but then converted back to sinus rhythm  Home metoprolol dose was increased with subsequent normal sinus rhythm and controlled heart rates  Echo was greatly unremarkable with normal EF  Patient placed on Eliquis and agrees for outpatient follow-up with cardiology  The patient, initially admitted to the hospital as inpatient, was discharged earlier than expected given the following: Conversion to normal sinus rhythm, great improvement in symptoms  Please see above list of diagnoses and related plan for additional information  Condition at Discharge: fair    Discharge Day Visit / Exam:   Subjective:  Seen and examined  Feeling well today , no recurrence of palpations  Vitals: Blood Pressure: 127/59 (04/03/23 1120)  Pulse: 57 (04/03/23 1120)  Temperature: 97 9 °F (36 6 °C) (04/03/23 1120)  Temp Source: Temporal (04/02/23 1217)  Respirations: 19 (04/03/23 1120)  Height: 5' 8\" (172 7 cm) (04/03/23 0841)  Weight - Scale: 128 kg (282 lb 3 oz) (04/03/23 0841)  SpO2: 93 % (04/03/23 1120)  Exam:   Physical Exam  Vitals and nursing note reviewed  Constitutional:       General: She is not in acute distress  Appearance: Normal appearance  She is obese  HENT:      Head: Normocephalic and atraumatic  Nose: No congestion  Mouth/Throat:      Mouth: Mucous membranes are moist    Eyes:      Conjunctiva/sclera: Conjunctivae normal    Cardiovascular:      Rate and Rhythm: Normal rate and regular rhythm  Pulses: Normal pulses  Heart sounds: Normal heart sounds  No murmur heard  Pulmonary:      Effort: Pulmonary effort is normal  No respiratory distress        Breath " sounds: Normal breath sounds  Abdominal:      General: Bowel sounds are normal       Palpations: Abdomen is soft  Tenderness: There is no abdominal tenderness  Musculoskeletal:         General: Normal range of motion  Right lower leg: No edema  Left lower leg: No edema  Skin:     General: Skin is warm and dry  Neurological:      Mental Status: She is alert and oriented to person, place, and time  Discussion with Family: Updated  () at bedside  Discharge instructions/Information to patient and family:   See after visit summary for information provided to patient and family  Provisions for Follow-Up Care:  See after visit summary for information related to follow-up care and any pertinent home health orders  Disposition:   Home    Planned Readmission: None     Discharge Statement:  I spent 45 minutes discharging the patient  This time was spent on the day of discharge  I had direct contact with the patient on the day of discharge  Greater than 50% of the total time was spent examining patient, answering all patient questions, arranging and discussing plan of care with patient as well as directly providing post-discharge instructions  Additional time then spent on discharge activities  Discharge Medications:  See after visit summary for reconciled discharge medications provided to patient and/or family        **Please Note: This note may have been constructed using a voice recognition system**

## 2023-04-03 NOTE — UTILIZATION REVIEW
Initial Clinical Review    Admission: Date/Time/Statement:   Admission Orders (From admission, onward)     Ordered        04/02/23 1450  INPATIENT ADMISSION  Once                      Orders Placed This Encounter   Procedures   • INPATIENT ADMISSION     Standing Status:   Standing     Number of Occurrences:   1     Order Specific Question:   Level of Care     Answer:   Med Surg [16]     Order Specific Question:   Estimated length of stay     Answer:   More than 2 Midnights     Order Specific Question:   Certification     Answer:   I certify that inpatient services are medically necessary for this patient for a duration of greater than two midnights  See H&P and MD Progress Notes for additional information about the patient's course of treatment  ED Arrival Information     Expected   -    Arrival   4/2/2023 11:57    Acuity   Urgent            Means of arrival   Ambulance    Escorted by   [employer]    Admission type   Emergency            Arrival complaint   chest pain           Chief Complaint   Patient presents with   • Chest Pain     Chest pain that started at 0930 this am, pt describes as pressure in chest  Pt took 2 nitro prior to EMS arrival        Initial Presentation: 77 y o  female to ED via EMS from home  Present with a PMHX of diabetes, CAD s/p cath with stent in 201&), diabetes, hypertension, obesity who presents with palpitation, which started since this morning when patient was resting, patient reports she was feeling the her heart was racing  No hx of atrial fibrillation or atrial flutter  She took 2x nitro and 1x aspirin 81mg prior to EMS arrival   Admitted to M/S with DX: Atrial flutter   on exam: Tachy - irregular 129-155 sustained;  Tachypnea RR 20-34 sustained; HTN /91; chest pain / tightness; pain 4/10; Mg 1 7; patient placed on Cardizem drip after that patient converted back to sinus rhythm-continue to monitor  Given in ED Cardizem iv; Cardizem gtt; Mg Sulf iv  PLAN:  F/u echo; monitor labs; I/O; Daily wts; Accu-checks ACHS; fluid restriction 1800; Cardiopulmonary monitoring        Date: 4/3/23    Day 2  Remains in NSR  Plan: F/u echo; monitor labs; I/O; Daily wts;  Accu-checks ACHS; fluid restriction 1800; Cardiopulmonary monitoring      ED Triage Vitals   Temperature Pulse Respirations Blood Pressure SpO2   04/02/23 1217 04/02/23 1200 04/02/23 1200 04/02/23 1200 04/02/23 1200   98 1 °F (36 7 °C) 96 16 (!) 194/91 97 %      Temp Source Heart Rate Source Patient Position - Orthostatic VS BP Location FiO2 (%)   04/02/23 1217 04/02/23 1200 04/02/23 1200 04/02/23 1200 --   Temporal Monitor Lying Left arm       Pain Score       04/02/23 1200       4          Wt Readings from Last 1 Encounters:   04/03/23 128 kg (282 lb 3 oz)     Additional Vital Signs:   Date/Time Temp Pulse Resp BP MAP (mmHg) SpO2 O2 Device Patient Position - Orthostatic VS   04/03/23 11:20:02 97 9 °F (36 6 °C) 57 19 127/59 82 93 % -- --   04/03/23 0841 -- 62 -- 120/62 -- -- -- --   04/03/23 07:52:33 97 5 °F (36 4 °C) 61 18 120/62 81 91 % -- --   04/03/23 0300 97 9 °F (36 6 °C) 64 18 149/71 97 94 % -- --   04/02/23 23:02:30 97 7 °F (36 5 °C) 59 20 119/49 Abnormal  72 94 % -- --   04/02/23 2132 -- 61 -- 108/62 -- -- -- Sitting   04/02/23 2000 -- -- -- -- -- -- None (Room air) --   04/02/23 19:26:45 97 5 °F (36 4 °C) 65 -- 95/66 76 95 % -- Sitting   04/02/23 19:25:02 97 5 °F (36 4 °C) 67 20 96/64 75 94 % -- Sitting   04/02/23 15:34:26 97 7 °F (36 5 °C) 75 -- 142/85 104 96 % -- --   04/02/23 1530 -- -- -- -- -- 98 % None (Room air) --   04/02/23 1517 -- 70 -- 137/79 -- -- -- --   04/02/23 1500 -- 72 17 128/76 94 94 % None (Room air) --   04/02/23 1430 -- 75 28 Abnormal  127/71 92 95 % None (Room air) --   04/02/23 1415 -- 74 24 Abnormal  133/73 98 95 % None (Room air) Lying   04/02/23 1330 -- 129 Abnormal  19 118/64 83 94 % None (Room air) Lying   04/02/23 1300 -- 132 Abnormal  20 131/63 -- 93 % None (Room air) Lying   23 1245 -- 133 Abnormal  34 Abnormal  -- -- 93 % None (Room air) Lying   23 1230 -- 81 20 137/69 99 94 % None (Room air) --   23 1229 -- 142 Abnormal  -- -- -- -- -- --   23 1225 -- 90 -- -- -- -- -- --   23 1217 98 1 °F (36 7 °C) -- -- -- -- -- -- --   23 1215 -- 155 Abnormal  20 151/81 107 96 % -- --   23 1203 -- -- -- -- -- -- None (Room air) --   23 1200 -- 96 16 194/91 Abnormal  -- 97 % None (Room air) Lying       EK/2/23 @ 1200  Atrial flutter with 2:1 A-V conduction  Non-specific intra-ventricular conduction block  Abnormal ECG  When compared with ECG of 2021 06:18,  Atrial flutter has replaced Sinus rhythm  Vent  rate has increased BY  71 BPM  Questionable change in QRS duration    23 @ 1205  Normal sinus rhythm  Low voltage QRS  Borderline ECG  When compared with ECG of 2023 12:00, (unconfirmed)  Sinus rhythm has replaced Atrial flutter  Vent  rate has decreased BY  47 BPM  QRS duration has decreased  ST no longer depressed in Inferior leads  Nonspecific T wave abnormality no longer evident in Anterior leads     23 @ 1411  Normal sinus rhythm  Low voltage QRS  Borderline ECG  When compared with ECG of 2023 12:05, (unconfirmed)  No significant change was found      Pertinent Labs/Diagnostic Test Results:   X-ray chest 2 views   ED Interpretation by Zakiya Chavarria MD ( [id number])   No acute cardiopulm abnl      Final Result by Viva Opitz, MD ( 159)      No acute cardiopulmonary disease                    Workstation performed: [id number]               Results from last 7 days   Lab Units 23  1210   WBC Thousand/uL 9 78   HEMOGLOBIN g/dL 15 8*   HEMATOCRIT % 49 2*   PLATELETS Thousands/uL 235   NEUTROS ABS Thousands/µL 5 71         Results from last 7 days   Lab Units 23  0524 23  1210   SODIUM mmol/L 137 139   POTASSIUM mmol/L 4 0 3 7   CHLORIDE mmol/L 104 102   CO2 mmol/L 24 26 ANION GAP mmol/L 9 11   BUN mg/dL 23 22   CREATININE mg/dL 1 12 1 16   EGFR ml/min/1 73sq m 49 47   CALCIUM mg/dL 8 9 9 4   MAGNESIUM mg/dL 2 1 1 7*     Results from last 7 days   Lab Units 04/02/23  1210   AST U/L 18   ALT U/L 16   ALK PHOS U/L 60   TOTAL PROTEIN g/dL 7 4   ALBUMIN g/dL 4 3   TOTAL BILIRUBIN mg/dL 0 62     Results from last 7 days   Lab Units 04/03/23  1117 04/03/23  0750 04/02/23  2105 04/02/23  1642   POC GLUCOSE mg/dl 108 121 95 100     Results from last 7 days   Lab Units 04/03/23  0524 04/02/23  1210   GLUCOSE RANDOM mg/dL 124 125         Results from last 7 days   Lab Units 04/02/23  1210   HEMOGLOBIN A1C % 6 8*   EAG mg/dl 148       Results from last 7 days   Lab Units 04/02/23  1658 04/02/23  1409 04/02/23  1210   HS TNI 0HR ng/L  --   --  7   HS TNI 2HR ng/L  --  7  --    HSTNI D2 ng/L  --  0  --    HS TNI 4HR ng/L 7  --   --    HSTNI D4 ng/L 0  --   --        Results from last 7 days   Lab Units 04/02/23  1210   TSH 3RD GENERATON uIU/mL 0 060*       ED Treatment:   Medication Administration from 04/02/2023 1157 to 04/02/2023 1526       Date/Time Order Dose Route Action     04/02/2023 1217 EDT diltiazem (CARDIZEM) injection 15 mg 15 mg Intravenous Given     04/02/2023 1246 EDT magnesium sulfate 2 g/50 mL IVPB (premix) 2 g 2 g Intravenous New Bag     04/02/2023 1359 EDT diltiazem (CARDIZEM) 125 mg in sodium chloride 0 9 % 125 mL infusion 5 mg/hr Intravenous New Bag     04/02/2023 1517 EDT metoprolol succinate (TOPROL-XL) 24 hr tablet 50 mg 50 mg Oral Given     04/02/2023 1517 EDT pantoprazole (PROTONIX) EC tablet 20 mg 20 mg Oral Given     04/02/2023 1510 EDT potassium chloride (K-DUR,KLOR-CON) CR tablet 40 mEq 40 mEq Oral Given          Present on Admission:  • Type 2 diabetes mellitus with hyperglycemia, without long-term current use of insulin (HCC)  • Acquired hypothyroidism  • Atrial flutter (HCC)  • Hypomagnesemia      Admitting Diagnosis: Atrial flutter ([facility] [address] ) [I48 92]  Hypomagnesemia [E83 42]  Chest pain [R07 9]  History of hypothyroidism [Z86 39]     Age/Sex: 77 y o  female     Admission Orders: SCD's; I/O; Daily wts; Consistent Carbohydrate Diet  Accu-checks ACHS; fluid restriction 1800      Scheduled Medications:  apixaban, 5 mg, Oral, BID  aspirin, 81 mg, Oral, Daily  docusate sodium, 100 mg, Oral, BID  ezetimibe, 10 mg, Oral, HS  insulin lispro, 1-6 Units, Subcutaneous, TID AC  insulin lispro, 1-6 Units, Subcutaneous, HS  levothyroxine, 150 mcg, Oral, Once per day on Mon Tue Wed Thu Fri  [START ON 4/8/2023] levothyroxine, 175 mcg, Oral, Once per day on Sun Sat  losartan, 50 mg, Oral, Daily  magnesium Oxide, 400 mg, Oral, BID  metoprolol succinate, 50 mg, Oral, Daily  pantoprazole, 20 mg, Oral, Daily      Continuous IV Infusions: None     PRN Meds:  acetaminophen, 650 mg, Oral, Q6H PRN  nitroglycerin, 0 4 mg, Sublingual, Q5 Min PRN  ondansetron, 4 mg, Intravenous, Q6H PRN  sodium chloride (PF), 3 mL, Intravenous, Q1H PRN        IP CONSULT TO CASE MANAGEMENT    Network Utilization Review Department  ATTENTION: Please call with any questions or concerns to [phone] and carefully listen to the prompts so that you are directed to the right person  All voicemails are confidential   Sera Cadena all requests for admission clinical reviews, approved or denied determinations and any other requests to dedicated fax number below belonging to the campus where the patient is receiving treatment   List of dedicated fax numbers for the Facilities:  [address] DENIALS (Administrative/Medical Necessity) [phone]   [address] (Maternity/NICU/Pediatrics) [phone]   [address] [phone]   [address] [phone]   [address] - [facility] [address] [place] [address] [phone]   [address] Margarita Nolansandra [facility] 134 815 [facility] [phone]

## 2023-04-03 NOTE — CASE MANAGEMENT
Case Management Assessment & Discharge Planning Note    Patient name Mookie Mancera  Location [address] 220/-22 MRN [id number]  : 1955 Date 4/3/2023       Current Admission Date: 2023  Current Admission Diagnosis:Atrial flutter [facility])   Patient Active Problem List    Diagnosis Date Noted   • Morbid obesity with BMI of 40 0-44 9, adult ([facility] [address] ) 2023   • Atrial fl[facility] ([facility] ) 2023   • Hypomagnesemia 2023   • Type 2 diabetes mellitus with hyperglycemia, without long-term current use of insulin ([facility] ) 2021   • Acquired hypothyroidism 2021   • Essential hypertension 2021   • Leg edema    • Biliary colic       LOS (days): 1  Geometric Mean LOS (GMLOS) (days):   Days to GMLOS:     OBJECTIVE:    Risk of Unplanned Readmission Score: 9 64     CM met with patient at the bedside,baseline information  was obtained  CM discussed the role of CM in helping the patient develop a discharge plan and assist the patient in carrying out their plan  Current admission status: Inpatient  Referral Reason: Other (Post Acute Placement (specify)   Post Acute Home Needs (VNA/DME/Infusion)   Medications)    Preferred Pharmacy:   [address] [address]  Phone: [phone] Fax:  [phone] - [place], [place] - [address]  [facility]  [facility] [address]  Phone: [phone] Fax: [phone]    Primary Care Provider: No primary care provider on file  Primary Insurance: [employer] [facility]  Secondary Insurance: BLUE CROSS    ASSESSMENT:  Dominick Noel Proxies    There are no active Health Care Proxies on file         Advance Directives  Does patient have a [address] [place] [address] Avenue?: No  Was patient offered paperwork?: Yes  Does patient currently have a Health Care decision maker?: Yes, please see Health Care Proxy section  Does patient have Advance Directives?: No  Was patient offered paperwork?: Yes         Readmission Root Cause  30 Day Readmission: No    Patient Information  Admitted from[de-identified] Home  Mental Status: Alert  During Assessment patient was accompanied by: Spouse  Assessment information provided by[de-identified] Patient  Primary Caregiver: Self  Support Systems: Self, Spouse/significant other  [place] of Residence: One [facility] do you live in?: [address] entry access options   Select all that apply : Stairs  Number of steps to enter home : 7  Do the steps have railings?: Yes  Type of Current Residence: 3 story home  Upon entering residence, is there a bedroom on the main floor (no further steps)?: No  A bedroom is located on the following floor levels of residence (select all that apply):: 2nd Floor  Upon entering residence, is there a bathroom on the main floor (no further steps)?: Yes  Number of steps to 2nd floor from main floor: One Flight  In the last 12 months, was there a time when you were not able to pay the mortgage or rent on time?: No  In the last 12 months, how many places have you lived?: 1  In the last 12 months, was there a time when you did not have a steady place to sleep or slept in a shelter (including now)?: No  Homeless/housing insecurity resource given?: N/A  Living Arrangements: Lives w/ Spouse/significant other  Is patient a ?: No    Activities of Daily Living Prior to Admission  Functional Status: Independent  Completes ADLs independently?: Yes  Ambulates independently?: Yes  Does patient use assisted devices?: No  Does patient currently own DME?: No  Does patient have a history of Outpatient Therapy (PT/OT)?: No  Does the patient have a history of Short-Term Rehab?: No  Does patient have a history of HHC?: No  Does patient currently have [facility]?: No         Patient Information Continued  Income Source: Pension/jail  Does patient have prescription coverage?: Yes  Within the past 12 months, you worried that your food would run out before you got the money to buy more : Never true  Within the past 12 months, the food you bought just didn't last and you didn't have money to get more : Never true  Food insecurity resource given?: N/A  Does patient receive dialysis treatments?: No  Does patient have a history of substance abuse?: No  Does patient have a history of Mental Health Diagnosis?: No         Means of Transportation  Means of Transport to Appts[de-identified] Drives Self  In the past 12 months, has lack of transportation kept you from medical appointments or from getting medications?: No  In the past 12 months, has lack of transportation kept you from meetings, work, or from getting things needed for daily living?: No  Was application for public transport provided?: N/A        DISCHARGE DETAILS:    Discharge planning discussed with[de-identified] patient  Freedom of Choice: Yes  Comments - Freedom of Choice: No needs identified at this time  Pt stated that her spouse could help with any psot diwscharge needs  CM contacted family/caregiver?: Yes  Were Treatment Team discharge recommendations reviewed with patient/caregiver?: Yes  Did patient/caregiver verbalize understanding of patient care needs?: Yes  Were patient/caregiver advised of the risks associated with not following Treatment Team discharge recommendations?: Yes    Contacts  Patient Contacts: Carla Iglesias  Relationship to Patient[de-identified] Family  Contact Method: In Person  Reason/Outcome: Discharge [address]         Is the patient interested in [facility] at discharge?: No    DME Referral Provided  Referral made for DME?: No              Treatment Team Recommendation: Home  Discharge Destination Plan[de-identified] Home         CM at bedside to discuss post discharge options  Pt stated that if she needs any help her  can provide it to her  CM to determine cost of Eliquis for pt  Cost from [facility] in [place] is $30  CM relayed cost to pt   Living will/POA paperwork provided

## 2023-04-03 NOTE — PLAN OF CARE
Problem: PAIN - ADULT  Goal: Verbalizes/displays adequate comfort level or baseline comfort level  Description: Interventions:  - Encourage patient to monitor pain and request assistance  - Assess pain using appropriate pain scale  - Administer analgesics based on type and severity of pain and evaluate response  - Implement non-pharmacological measures as appropriate and evaluate response  - Consider cultural and social influences on pain and pain management  - Notify physician/advanced practitioner if interventions unsuccessful or patient reports new pain  Outcome: Progressing     Problem: INFECTION - ADULT  Goal: Absence or prevention of progression during hospitalization  Description: INTERVENTIONS:  - Assess and monitor for signs and symptoms of infection  - Monitor lab/diagnostic results  - Monitor all insertion sites, i e  indwelling lines, tubes, and drains  - Monitor endotracheal if appropriate and nasal secretions for changes in amount and color  - [place] appropriate cooling/warming therapies per order  - Administer medications as ordered  - Instruct and encourage patient and family to use good hand hygiene technique  - Identify and instruct in appropriate isolation precautions for identified infection/condition  Outcome: Progressing  Goal: Absence of fever/infection during neutropenic period  Description: INTERVENTIONS:  - Monitor WBC    Outcome: Progressing     Problem: SAFETY ADULT  Goal: Patient will remain free of falls  Description: INTERVENTIONS:  - Educate patient/family on patient safety including physical limitations  - Instruct patient to call for assistance with activity   - Consult OT/PT to assist with strengthening/mobility   - Keep Call bell within reach  - Keep bed low and locked with side rails adjusted as appropriate  - Keep care items and personal belongings within reach  - Initiate and maintain comfort rounds  - Make Fall Risk Sign visible to staff  - Apply yellow socks and bracelet for high fall risk patients  - Consider moving patient to room near nurses station  Outcome: Progressing  Goal: Maintain or return to baseline ADL function  Description: INTERVENTIONS:  -  Assess patient's ability to carry out ADLs; assess patient's baseline for ADL function and identify physical deficits which impact ability to perform ADLs (bathing, care of mouth/teeth, toileting, grooming, dressing, etc )  - Assess/evaluate cause of self-care deficits   - Assess range of motion  - Assess patient's mobility; develop plan if impaired  - Assess patient's need for assistive devices and provide as appropriate  - Encourage maximum independence but intervene and supervise when necessary  - Involve family in performance of ADLs  - Assess for home care needs following discharge   - Consider OT consult to assist with ADL evaluation and planning for discharge  - Provide patient education as appropriate  Outcome: Progressing  Goal: Maintains/Returns to pre admission functional level  Description: INTERVENTIONS:  - Perform BMAT or MOVE assessment daily    - Set and communicate daily mobility goal to care team and patient/family/caregiver     - Collaborate with rehabilitation services on mobility goals if consulted  - Out of bed for toileting  - Record patient progress and toleration of activity level   Outcome: Progressing     Problem: DISCHARGE PLANNING  Goal: Discharge to home or other facility with appropriate resources  Description: INTERVENTIONS:  - Identify barriers to discharge w/patient and caregiver  - Arrange for needed discharge resources and transportation as appropriate  - Identify discharge learning needs (meds, wound care, etc )  - Arrange for interpretive services to assist at discharge as needed  - Refer to Case Management Department for coordinating discharge planning if the patient needs post-hospital services based on physician/advanced practitioner order or complex needs related to functional status, cognitive ability, or social support system  Outcome: Progressing     Problem: Knowledge Deficit  Goal: Patient/family/caregiver demonstrates understanding of disease process, treatment plan, medications, and discharge instructions  Description: Complete learning assessment and assess knowledge base  Interventions:  - Provide teaching at level of understanding  - Provide teaching via preferred learning methods  Outcome: Progressing     Problem: CARDIOVASCULAR - ADULT  Goal: Absence of cardiac dysrhythmias or at baseline rhythm  Description: INTERVENTIONS:  - Continuous cardiac monitoring, vital signs, obtain 12 lead EKG if ordered  - Administer antiarrhythmic and heart rate control medications as ordered  - Monitor electrolytes and administer replacement therapy as ordered  Outcome: Progressing     Problem: Nutrition/Hydration-ADULT  Goal: Nutrient/Hydration intake appropriate for improving, restoring or maintaining nutritional needs  Description: Monitor and assess patient's nutrition/hydration status for malnutrition  Collaborate with interdisciplinary team and initiate plan and interventions as ordered  Monitor patient's weight and dietary intake as ordered or per policy  Utilize nutrition screening tool and intervene as necessary  Determine patient's food preferences and provide high-protein, high-caloric foods as appropriate       INTERVENTIONS:  - Monitor oral intake, urinary output, labs, and treatment plans  - Assess nutrition and hydration status and recommend course of action  - Evaluate amount of meals eaten  - Assist patient with eating if necessary   - Allow adequate time for meals  - Recommend/ encourage appropriate diets, oral nutritional supplements, and vitamin/mineral supplements  - Order, calculate, and assess calorie counts as needed  - Recommend, monitor, and adjust tube feedings and TPN/PPN based on assessed needs  - Assess need for intravenous fluids  - Provide specific nutrition/hydration education as appropriate  - Include patient/family/caregiver in decisions related to nutrition  Outcome: Progressing

## 2023-04-04 NOTE — UTILIZATION REVIEW
NOTIFICATION OF ADMISSION DISCHARGE   This is a Notification of Discharge from [facility]  Please be advised that this patient has been discharge from our facility  Below you will find the admission and discharge date and time including the patient’s disposition  UTILIZATION REVIEW CONTACT:  Cezar Jones  Utilization   Network Utilization Review Department  Phone: [phone] x carefully listen to the prompts  All voicemails are confidential   Email: [email or web address]     ADMISSION INFORMATION  PRESENTATION DATE: 4/2/2023 11:57 AM  OBERVATION ADMISSION DATE:   INPATIENT ADMISSION DATE: 4/2/23  2:50 PM   DISCHARGE DATE: 4/3/2023 12:51 PM   DISPOSITION:Home/Self Care    IMPORTANT INFORMATION:  Send all requests for admission clinical reviews, approved or denied determinations and any other requests to dedicated fax number below belonging to the campus where the patient is receiving treatment   List of dedicated fax numbers:  [address] DENIALS (Administrative/Medical Necessity) [phone]   [address] (Maternity/NICU/Pediatrics) [phone]   [facility] [phone]   [place] [address] [phone]   [address] [phone]   [address] [facility] [phone] 90 [facility] [phone]   [address] [place] [address] [phone]   [facility]  [phone]   [address] [facility] [phone]   [address] [facility] [address] [facility] [phone]